# Patient Record
Sex: MALE | Race: WHITE | HISPANIC OR LATINO | Employment: FULL TIME | URBAN - METROPOLITAN AREA
[De-identification: names, ages, dates, MRNs, and addresses within clinical notes are randomized per-mention and may not be internally consistent; named-entity substitution may affect disease eponyms.]

---

## 2017-03-01 ENCOUNTER — OFFICE VISIT (OUTPATIENT)
Dept: URGENT CARE | Facility: URGENT CARE | Age: 42
End: 2017-03-01
Payer: COMMERCIAL

## 2017-03-01 VITALS
BODY MASS INDEX: 35.36 KG/M2 | SYSTOLIC BLOOD PRESSURE: 118 MMHG | HEART RATE: 101 BPM | HEIGHT: 66 IN | DIASTOLIC BLOOD PRESSURE: 60 MMHG | WEIGHT: 220 LBS | OXYGEN SATURATION: 97 % | TEMPERATURE: 98.8 F

## 2017-03-01 DIAGNOSIS — J20.9 ACUTE BRONCHITIS WITH SYMPTOMS > 10 DAYS: Primary | ICD-10-CM

## 2017-03-01 DIAGNOSIS — R05.9 COUGH: ICD-10-CM

## 2017-03-01 PROCEDURE — 87801 DETECT AGNT MULT DNA AMPLI: CPT | Performed by: FAMILY MEDICINE

## 2017-03-01 PROCEDURE — 99203 OFFICE O/P NEW LOW 30 MIN: CPT | Performed by: FAMILY MEDICINE

## 2017-03-01 RX ORDER — CODEINE PHOSPHATE AND GUAIFENESIN 10; 100 MG/5ML; MG/5ML
2 SOLUTION ORAL EVERY 4 HOURS PRN
Qty: 120 ML | Refills: 0 | Status: SHIPPED | OUTPATIENT
Start: 2017-03-01 | End: 2017-03-30

## 2017-03-01 RX ORDER — AZITHROMYCIN 250 MG/1
TABLET, FILM COATED ORAL
Qty: 6 TABLET | Refills: 0 | Status: SHIPPED | OUTPATIENT
Start: 2017-03-01 | End: 2017-03-30

## 2017-03-01 RX ORDER — BENZONATATE 200 MG/1
200 CAPSULE ORAL 3 TIMES DAILY PRN
Qty: 30 CAPSULE | Refills: 0 | Status: SHIPPED | OUTPATIENT
Start: 2017-03-01 | End: 2017-03-30

## 2017-03-01 NOTE — PROGRESS NOTES
"SUBJECTIVE:   Darrell Olivares is a 41 year old male presenting with a chief complaint of cough .  No history of asthma.  Onset of symptoms was 3 week(s) ago.  Course of illness is worsening.    Severity moderate  Current and Associated symptoms: cough   Treatment measures tried include Fluids, OTC meds - nyquil, severe cold OTC, ibuprofen and Rest.  Predisposing factors include None.    No past medical history on file.  Current Outpatient Prescriptions   Medication Sig Dispense Refill     OVER-THE-COUNTER Cough medicine       TESTOSTERONE IM        Social History   Substance Use Topics     Smoking status: Never Smoker     Smokeless tobacco: Not on file     Alcohol use Not on file       ROS:  CONSTITUTIONAL:NEGATIVE for fever, chills, change in weight and POSITIVE  for fatigue  INTEGUMENTARY/SKIN: NEGATIVE for worrisome rashes, moles or lesions  ENT/MOUTH: NEGATIVE for ear, mouth and throat problems and POSITIVE for nasal congestion and postnasal drainage  RESP:POSITIVE for cough-non productive  CV: NEGATIVE for chest pain, palpitations or peripheral edema  GI: NEGATIVE for nausea, abdominal pain, heartburn, or change in bowel habits  MUSCULOSKELETAL: NEGATIVE for significant arthralgias or myalgia    OBJECTIVE  :/60 (BP Location: Right arm, Patient Position: Chair, Cuff Size: Adult Regular)  Pulse 101  Temp 98.8  F (37.1  C) (Oral)  Ht 5' 6\" (1.676 m)  Wt 220 lb (99.8 kg)  SpO2 97%  BMI 35.51 kg/m2  GENERAL APPEARANCE: healthy, alert and no distress  EYES: EOMI,  PERRL, conjunctiva clear  HENT: ear canals and TM's normal.  Nose and mouth without ulcers, erythema or lesions  NECK: supple, nontender, no lymphadenopathy  RESP: lungs clear to auscultation - no rales, rhonchi or wheezes  CV: regular rates and rhythm, normal S1 S2, no murmur noted  NEURO: Normal strength and tone, sensory exam grossly normal,  normal speech and mentation  SKIN: no suspicious lesions or rashes  PSYCH: mentation appears normal and " affect normal/bright    ASSESSMENT/PLAN:  (J20.9) Acute bronchitis with symptoms > 10 days  (primary encounter diagnosis)  Plan: azithromycin (ZITHROMAX) 250 MG tablet            (R05) Cough  Comment: bronchitis  Plan: Bordetella per/paraper PCR, guaiFENesin-codeine        (ROBITUSSIN AC) 100-10 MG/5ML SOLN solution,         benzonatate (TESSALON) 200 MG capsule            Reassurance given, reviewed symptomatic treatment, plenty of fluids and rest.  RX Zpak given due to prolong duration of cough.  RX tessalon perles and tal AC given to help with cough.  Due to prolong cough, will screen for pertussis, discussed that if positive that Zpak will already cover for this infection but that cough will persist for several months.    Encourage to establish care with primary provider for routine care and follow up if no resolution of symptoms.    Dwain Comer MD

## 2017-03-01 NOTE — PATIENT INSTRUCTIONS
Take full course of antibiotic.  Okay to use tessalon perles to help with cough during the day and robitussin with codeine at bedtime.  Okay for tylenol and ibuprofen for discomfort.      Bronchitis, Antibiotic Treatment (Adult)    Bronchitis is an infection of the air passages (bronchial tubes) in your lungs. It often occurs when you have a cold. This illness is contagious during the first few days and is spread through the air by coughing and sneezing, or by direct contact (touching the sick person and then touching your own eyes, nose, or mouth).  Symptoms of bronchitis include cough with mucus (phlegm) and low-grade fever. Bronchitis usually lasts 7 to 14 days. Mild cases can be treated with simple home remedies. More severe infection is treated with an antibiotic.  Home care  Follow these guidelines when caring for yourself at home:    If your symptoms are severe, rest at home for the first 2 to 3 days. When you go back to your usual activities, don't let yourself get too tired.    Do not smoke. Also avoid being exposed to secondhand smoke.    You may use over-the-counter medicines to control fever or pain, unless another medicine was prescribed. (Note: If you have chronic liver or kidney disease or have ever had a stomach ulcer or gastrointestinal bleeding, talk with your healthcare provider before using these medicines. Also talk to your provider if you are taking medicine to prevent blood clots.) Aspirin should never be given to anyone younger than 18 years of age who is ill with a viral infection or fever. It may cause severe liver or brain damage.    Your appetite may be poor, so a light diet is fine. Avoid dehydration by drinking 6 to 8 glasses of fluids per day (such as water, soft drinks, sports drinks, juices, tea, or soup). Extra fluids will help loosen secretions in the nose and lungs.    Over-the-counter cough, cold, and sore-throat medicines will not shorten the length of the illness, but they may  "be helpful to reduce symptoms. (Note: Do not use decongestants if you have high blood pressure.)    Finish all antibiotic medicine. Do this even if you are feeling better after only a few days.  Follow-up care  Follow up with your healthcare provider, or as advised. If you had an X-ray or ECG (electrocardiogram), a specialist will review it. You will be notified of any new findings that may affect your care.  Note: If you are age 65 or older, or if you have a chronic lung disease or condition that affects your immune system, or you smoke, talk to your healthcare provider about having pneumococcal vaccinations and a yearly influenza vaccination (flu shot).  When to seek medical advice  Call your healthcare provider right away if any of these occur:    Fever of 100.4 F (38 C) or higher    Coughing up increased amounts of colored sputum    Weakness, drowsiness, headache, facial pain, ear pain, or a stiff neck   Call 911, or get immediate medical care  Contact emergency services right away if any of these occur.    Coughing up blood    Worsening weakness, drowsiness, headache, or stiff neck    Trouble breathing, wheezing, or pain with breathing    2022-7718 [employer]. [address] [place] [address]. All rights reserved. This information is not intended as a substitute for professional medical care. Always follow your healthcare professional's instructions.        Whooping Cough (Pertussis) (Adult)  Whooping cough (also called pertussis) is a bacterial infection in the respiratory tract. It can be a very serious infection in infants and older persons. In healthy older children and adults, it is generally mild.  Pertussis is highly contagious. The infection is spread through the air by coughing or sneezing. The illness starts like an ordinary cold with mild cough, congestion, and low fever. Symptoms then develop that may include:    Coughing spells that cause a \"whooping\" sound when breathing " in    Gagging or vomiting after coughing    Poor appetite    Feeling very tired    Thick mucus in the nose and throat  Antibiotics are used to treat this illness. Even with treatment, it may take up to 3 months for the cough to go away completely.  Vaccination prevents pertussis in children. The vaccine effect lessens after 5 to 10 years. So a booster vaccine is often needed. Teens and adults who were vaccinated as children and have not had a booster can be infected and may spread the infection to unvaccinated infants and children. Be sure to ask your healthcare provider whether anyone in your household needs a booster vaccination.  Home Care    Take all medicine as prescribed by the healthcare provider. Be sure to take antibiotics as directed until they are gone, even if you feel better. If you do not finish the antibiotics, the infection may come back and be harder to treat.    Rest and get plenty of sleep.    Stay home from work or school until you have completed at least 5 days of antibiotic treatment. If antibiotics are not used, stay home until 21 days after you first had symptoms of a cough. When resuming activity, go back to your normal routine gradually.    Avoid exposure to cigarette smoke.    Ask your healthcare provider before taking over-the-counter medicine for fever, pain, and coughing.    To avoid loss of fluids (dehydration), try drinking 6-8 glasses of fluids (water, juice, tea, soup) a day. Fluids will help loosen secretions in the nose and lungs.    Cover your mouth and nose when you sneeze or cough. Dispose of any tissues properly.    Wash your hands well with soap and water after you cough or sneeze, and frequently throughout the day.  Follow-up care  Follow up with your healthcare provider as advised.  When to seek medical advice  Call your healthcare provider right away for any of the following:    Trouble breathing or painful breathing    Cough with repeated gagging or vomiting    Coughing  up colored or bloody mucus    Severe headache or face, neck, or ear pain    Fever over 100.4 F (38.0 C) for more than 3 days    You don't start improving within 1 week    5279-7983 [employer]. [address] [place] [address], [place] [address]. All rights reserved. This information is not intended as a substitute for professional medical care. Always follow your healthcare professional's instructions.

## 2017-03-01 NOTE — MR AVS SNAPSHOT
After Visit Summary   3/1/2017    Darrell Olivares    MRN: [id number]           Patient Information     Date Of Birth          1975        Visit Information        Provider Department      3/1/2017 5:00 PM Dwain Comer MD [facility]        Today's Diagnoses     Acute bronchitis with symptoms > 10 days    -  1    Cough          Care Instructions    Take full course of antibiotic.  Okay to use tessalon perles to help with cough during the day and robitussin with codeine at bedtime.  Okay for tylenol and ibuprofen for discomfort.      Bronchitis, Antibiotic Treatment (Adult)    Bronchitis is an infection of the air passages (bronchial tubes) in your lungs. It often occurs when you have a cold. This illness is contagious during the first few days and is spread through the air by coughing and sneezing, or by direct contact (touching the sick person and then touching your own eyes, nose, or mouth).  Symptoms of bronchitis include cough with mucus (phlegm) and low-grade fever. Bronchitis usually lasts 7 to 14 days. Mild cases can be treated with simple home remedies. More severe infection is treated with an antibiotic.  Home care  Follow these guidelines when caring for yourself at home:    If your symptoms are severe, rest at home for the first 2 to 3 days. When you go back to your usual activities, don't let yourself get too tired.    Do not smoke. Also avoid being exposed to secondhand smoke.    You may use over-the-counter medicines to control fever or pain, unless another medicine was prescribed. (Note: If you have chronic liver or kidney disease or have ever had a stomach ulcer or gastrointestinal bleeding, talk with your healthcare provider before using these medicines. Also talk to your provider if you are taking medicine to prevent blood clots.) Aspirin should never be given to anyone younger than 18 years of age who is ill with a viral infection or fever. It may cause severe  liver or brain damage.    Your appetite may be poor, so a light diet is fine. Avoid dehydration by drinking 6 to 8 glasses of fluids per day (such as water, soft drinks, sports drinks, juices, tea, or soup). Extra fluids will help loosen secretions in the nose and lungs.    Over-the-counter cough, cold, and sore-throat medicines will not shorten the length of the illness, but they may be helpful to reduce symptoms. (Note: Do not use decongestants if you have high blood pressure.)    Finish all antibiotic medicine. Do this even if you are feeling better after only a few days.  Follow-up care  Follow up with your healthcare provider, or as advised. If you had an X-ray or ECG (electrocardiogram), a specialist will review it. You will be notified of any new findings that may affect your care.  Note: If you are age 65 or older, or if you have a chronic lung disease or condition that affects your immune system, or you smoke, talk to your healthcare provider about having pneumococcal vaccinations and a yearly influenza vaccination (flu shot).  When to seek medical advice  Call your healthcare provider right away if any of these occur:    Fever of 100.4 F (38 C) or higher    Coughing up increased amounts of colored sputum    Weakness, drowsiness, headache, facial pain, ear pain, or a stiff neck   Call 911, or get immediate medical care  Contact emergency services right away if any of these occur.    Coughing up blood    Worsening weakness, drowsiness, headache, or stiff neck    Trouble breathing, wheezing, or pain with breathing    [id number] [employer]. [address] [place] [address], [place] [address]. All rights reserved. This information is not intended as a substitute for professional medical care. Always follow your healthcare professional's instructions.        Whooping Cough (Pertussis) (Adult)  Whooping cough (also called pertussis) is a bacterial infection in the respiratory tract. It can be a very serious  "infection in infants and older persons. In healthy older children and adults, it is generally mild.  Pertussis is highly contagious. The infection is spread through the air by coughing or sneezing. The illness starts like an ordinary cold with mild cough, congestion, and low fever. Symptoms then develop that may include:    Coughing spells that cause a \"whooping\" sound when breathing in    Gagging or vomiting after coughing    Poor appetite    Feeling very tired    Thick mucus in the nose and throat  Antibiotics are used to treat this illness. Even with treatment, it may take up to 3 months for the cough to go away completely.  Vaccination prevents pertussis in children. The vaccine effect lessens after 5 to 10 years. So a booster vaccine is often needed. Teens and adults who were vaccinated as children and have not had a booster can be infected and may spread the infection to unvaccinated infants and children. Be sure to ask your healthcare provider whether anyone in your household needs a booster vaccination.  Home Care    Take all medicine as prescribed by the healthcare provider. Be sure to take antibiotics as directed until they are gone, even if you feel better. If you do not finish the antibiotics, the infection may come back and be harder to treat.    Rest and get plenty of sleep.    Stay home from work or school until you have completed at least 5 days of antibiotic treatment. If antibiotics are not used, stay home until 21 days after you first had symptoms of a cough. When resuming activity, go back to your normal routine gradually.    Avoid exposure to cigarette smoke.    Ask your healthcare provider before taking over-the-counter medicine for fever, pain, and coughing.    To avoid loss of fluids (dehydration), try drinking 6-8 glasses of fluids (water, juice, tea, soup) a day. Fluids will help loosen secretions in the nose and lungs.    Cover your mouth and nose when you sneeze or cough. Dispose of any " "tissues properly.    Wash your hands well with soap and water after you cough or sneeze, and frequently throughout the day.  Follow-up care  Follow up with your healthcare provider as advised.  When to seek medical advice  Call your healthcare provider right away for any of the following:    Trouble breathing or painful breathing    Cough with repeated gagging or vomiting    Coughing up colored or bloody mucus    Severe headache or face, neck, or ear pain    Fever over 100.4 F (38.0 C) for more than 3 days    You don't start improving within 1 week    5283-1485 [employer]. [address] [place] [address]. All rights reserved. This information is not intended as a substitute for professional medical care. Always follow your healthcare professional's instructions.              Follow-ups after your visit        Who to contact     If you have questions or need follow up information about today's clinic visit or your schedule please contact [facility] directly at [phone].  Normal or non-critical lab and imaging results will be communicated to you by [employer], letter or phone within 4 business days after the clinic has received the results. If you do not hear from us within 7 days, please contact the clinic through [employer] or phone. If you have a critical or abnormal lab result, we will notify you by phone as soon as possible.  Submit refill requests through [facility] ([facility]) or call your pharmacy and they will forward the refill request to us. Please allow 3 business days for your refill to be completed.          Additional Information About Your Visit        [facility] (NI[facility]) Information     [facility] ([facility]) lets you send messages to your doctor, view your test results, renew your prescriptions, schedule appointments and more. To sign up, go to [email or web address] . Click on \"Log in\" on the left side of the screen, which will take you to the Welcome page. Then click on \"Sign up Now\" on the right " "side of the page.     You will be asked to enter the access code listed below, as well as some personal information. Please follow the directions to create your username and password.     Your access code is: [email or web address]  Expires: 2017  5:59 PM     Your access code will  in 90 days. If you need help or a new code, please call your [facility] clinic or [phone].        Care EveryWhere ID     This is your Care EveryWhere ID. This could be used by other organizations to access your [facility] medical records  [id number]        Your Vitals Were     Pulse Temperature Height Pulse Oximetry BMI (Body Mass Index)       101 98.8  F (37.1  C) (Oral) 5' 6\" (1.676 m) 97% 35.51 kg/m2        Blood Pressure from Last 3 Encounters:   17 118/60    Weight from Last 3 Encounters:   17 220 lb (99.8 kg)              We Performed the Following     Bordetella per/paraper PCR          Today's Medication Changes          These changes are accurate as of: 3/1/17  5:59 PM.  If you have any questions, ask your nurse or doctor.               Start taking these medicines.        Dose/Directions    azithromycin 250 MG tablet   Commonly known as:  ZITHROMAX   Used for:  Acute bronchitis with symptoms > 10 days   Started by:  Dwain Comer MD        Two tablets first day, then one tablet daily for four days.   Quantity:  6 tablet   Refills:  0       benzonatate 200 MG capsule   Commonly known as:  TESSALON   Used for:  Cough   Started by:  Dwain Comer MD        Dose:  200 mg   Take 1 capsule (200 mg) by mouth 3 times daily as needed for cough   Quantity:  30 capsule   Refills:  0       guaiFENesin-codeine 100-10 MG/5ML Soln solution   Commonly known as:  ROBITUSSIN AC   Used for:  Cough   Started by:  Dwain Comer MD        Dose:  2 tsp.   Take 10 mLs by mouth every 4 hours as needed for cough   Quantity:  120 mL   Refills:  0            Where to get your medicines      Some of these will need a paper prescription and " others can be bought over the counter.  Ask your nurse if you have questions.     Bring a paper prescription for each of these medications     azithromycin 250 MG tablet    benzonatate 200 MG capsule    guaiFENesin-codeine 100-10 MG/5ML Soln solution                Primary Care Provider    None Specified       No primary provider on file.        Thank you!     Thank you for choosing [facility]  for your care. Our goal is always to provide you with excellent care. Hearing back from our patients is one way we can continue to improve our services. Please take a few minutes to complete the written survey that you may receive in the mail after your visit with us. Thank you!             Your Updated Medication List - Protect others around you: Learn how to safely use, store and throw away your medicines at www.disposemymeds.org.          This list is accurate as of: 3/1/17  5:59 PM.  Always use your most recent med list.                   Brand Name Dispense Instructions for use    azithromycin 250 MG tablet    ZITHROMAX    6 tablet    Two tablets first day, then one tablet daily for four days.       benzonatate 200 MG capsule    TESSALON    30 capsule    Take 1 capsule (200 mg) by mouth 3 times daily as needed for cough       guaiFENesin-codeine 100-10 MG/5ML Soln solution    ROBITUSSIN AC    120 mL    Take 10 mLs by mouth every 4 hours as needed for cough       OVER-THE-COUNTER      Cough medicine       TESTOSTERONE IM

## 2017-03-01 NOTE — NURSING NOTE
"Darrell Olivares;   Chief Complaint   Patient presents with     Cough     onset 2-3 weeks ago      Urgent Care     Initial /60 (BP Location: Right arm, Patient Position: Chair, Cuff Size: Adult Regular)  Pulse 101  Temp 98.8  F (37.1  C) (Oral)  Ht 5' 6\" (1.676 m)  Wt 220 lb (99.8 kg)  SpO2 97%  BMI 35.51 kg/m2 Estimated body mass index is 35.51 kg/(m^2) as calculated from the following:    Height as of this encounter: 5' 6\" (1.676 m).    Weight as of this encounter: 220 lb (99.8 kg)..  BP completed using cuff size regular.  Denia Rasmussen R.N.  "

## 2017-03-03 LAB
B PERT+PARAPERT DNA PNL SPEC NAA+PROBE: NORMAL
SPECIMEN SOURCE: NORMAL

## 2017-03-04 PROBLEM — R79.89 LOW TESTOSTERONE: Status: ACTIVE | Noted: 2017-03-04

## 2017-03-30 ENCOUNTER — OFFICE VISIT (OUTPATIENT)
Dept: PEDIATRICS | Facility: CLINIC | Age: 42
End: 2017-03-30
Payer: COMMERCIAL

## 2017-03-30 VITALS
BODY MASS INDEX: 33.75 KG/M2 | WEIGHT: 210 LBS | HEART RATE: 104 BPM | OXYGEN SATURATION: 96 % | SYSTOLIC BLOOD PRESSURE: 104 MMHG | DIASTOLIC BLOOD PRESSURE: 68 MMHG | HEIGHT: 66 IN | TEMPERATURE: 98.3 F

## 2017-03-30 DIAGNOSIS — R79.89 LOW TESTOSTERONE: ICD-10-CM

## 2017-03-30 DIAGNOSIS — E29.1 MALE HYPOGONADISM: Primary | ICD-10-CM

## 2017-03-30 LAB
ERYTHROCYTE [DISTWIDTH] IN BLOOD BY AUTOMATED COUNT: 11.9 % (ref 10–15)
HBA1C MFR BLD: 6.8 % (ref 4.3–6)
HCT VFR BLD AUTO: 47.2 % (ref 40–53)
HGB BLD-MCNC: 16.2 G/DL (ref 13.3–17.7)
MCH RBC QN AUTO: 30.2 PG (ref 26.5–33)
MCHC RBC AUTO-ENTMCNC: 34.3 G/DL (ref 31.5–36.5)
MCV RBC AUTO: 88 FL (ref 78–100)
PLATELET # BLD AUTO: 228 10E9/L (ref 150–450)
RBC # BLD AUTO: 5.36 10E12/L (ref 4.4–5.9)
WBC # BLD AUTO: 10.4 10E9/L (ref 4–11)

## 2017-03-30 PROCEDURE — 84403 ASSAY OF TOTAL TESTOSTERONE: CPT | Performed by: INTERNAL MEDICINE

## 2017-03-30 PROCEDURE — 84270 ASSAY OF SEX HORMONE GLOBUL: CPT | Performed by: INTERNAL MEDICINE

## 2017-03-30 PROCEDURE — 36415 COLL VENOUS BLD VENIPUNCTURE: CPT | Performed by: INTERNAL MEDICINE

## 2017-03-30 PROCEDURE — G0103 PSA SCREENING: HCPCS | Performed by: INTERNAL MEDICINE

## 2017-03-30 PROCEDURE — 80053 COMPREHEN METABOLIC PANEL: CPT | Performed by: INTERNAL MEDICINE

## 2017-03-30 PROCEDURE — 83036 HEMOGLOBIN GLYCOSYLATED A1C: CPT | Performed by: INTERNAL MEDICINE

## 2017-03-30 PROCEDURE — 99214 OFFICE O/P EST MOD 30 MIN: CPT | Performed by: INTERNAL MEDICINE

## 2017-03-30 PROCEDURE — 85027 COMPLETE CBC AUTOMATED: CPT | Performed by: INTERNAL MEDICINE

## 2017-03-30 PROCEDURE — 83721 ASSAY OF BLOOD LIPOPROTEIN: CPT | Mod: 59 | Performed by: INTERNAL MEDICINE

## 2017-03-30 PROCEDURE — 82465 ASSAY BLD/SERUM CHOLESTEROL: CPT | Performed by: INTERNAL MEDICINE

## 2017-03-30 RX ORDER — TESTOSTERONE CYPIONATE 200 MG/ML
200 INJECTION, SOLUTION INTRAMUSCULAR
Qty: 2 ML | Refills: 3 | Status: SHIPPED | OUTPATIENT
Start: 2017-03-30 | End: 2017-04-25

## 2017-03-30 NOTE — MR AVS SNAPSHOT
After Visit Summary   3/30/2017    Darrell Olivares    MRN: [id number]           Patient Information     Date Of Birth          1975        Visit Information        Provider Department      3/30/2017 3:10 PM Andre Fischer MD [facility]        Today's Diagnoses     Male hypogonadism    -  1    Low testosterone          Care Instructions    1) Restart previous therapy at 200 IM every 2 weeks    2) Labs today    Let me know if having issues with this.    Andre Fischer MD      Risks of Masculinizing Hormone Therapy (FtM)    Increased Risk  -Polycythemia: Increased amount of circulating red cells in the blood, which can cause heart failure or pre-dispose to blood clots or strokes.   -Weight gain: modest increased weight gain is often seen  -Lipids: often can decrease HDL (good cholesterol)  -Liver: can cause elevation in liver enzymes and rarely liver toxicity  -Psychiatric: may worsen underlying mental illness    Unclear risks  -Osteoporosis: decreased bone density can be seen in association with testosterone therapy  -Hypertension: elevated blood pressure can be seen with use of testosterone  -Fertility: may decrease ability to become pregnant. Fertility options should be considered before starting testosterone therapy.             Follow-ups after your visit        Who to contact     If you have questions or need follow up information about today's clinic visit or your schedule please contact [facility] directly at [phone].  Normal or non-critical lab and imaging results will be communicated to you by MyChart, letter or phone within 4 business days after the clinic has received the results. If you do not hear from us within 7 days, please contact the clinic through Fixstarshart or phone. If you have a critical or abnormal lab result, we will notify you by phone as soon as possible.  Submit refill requests through [employer] or call your pharmacy and they will forward the  "refill request to us. Please allow 3 business days for your refill to be completed.          Additional Information About Your Visit        [employer]atell[employer] Information     Tedcas lets you send messages to your doctor, view your test results, renew your prescriptions, schedule appointments and more. To sign up, go to [email or web address] . Click on \"Log in\" on the left side of the screen, which will take you to the Welcome page. Then click on \"Sign up Now\" on the right side of the page.     You will be asked to enter the access code listed below, as well as some personal information. Please follow the directions to create your username and password.     Your access code is: [email or web address]  Expires: 2017  6:59 PM     Your access code will  in 90 days. If you need help or a new code, please call your [facility] clinic or [phone].        Care EveryWhere ID     This is your Care EveryWhere ID. This could be used by other organizations to access your [facility] medical records  [id number]        Your Vitals Were     Pulse Temperature Height Pulse Oximetry BMI (Body Mass Index)       104 98.3  F (36.8  C) (Oral) 5' 6\" (1.676 m) 96% 33.89 kg/m2        Blood Pressure from Last 3 Encounters:   17 104/68   17 118/60    Weight from Last 3 Encounters:   17 210 lb (95.3 kg)   17 220 lb (99.8 kg)              We Performed the Following     **Testosterone Free and Total FUTURE anytime     CBC with platelets     Cholesterol     Comprehensive metabolic panel     Hemoglobin A1c     LDL cholesterol direct     PSA, screen          Today's Medication Changes          These changes are accurate as of: 3/30/17  3:34 PM.  If you have any questions, ask your nurse or doctor.               Start taking these medicines.        Dose/Directions    testosterone cypionate 200 MG/ML injection   Commonly known as:  DEPOTESTOTERONE CYPIONATE   Used for:  Male hypogonadism, Low testosterone   Started by:  Andre Fischer " MD [facility]        Dose:  200 mg   Inject 1 mL (200 mg) into the muscle every 14 days   Quantity:  2 mL   Refills:  3            Where to get your medicines      Some of these will need a paper prescription and others can be bought over the counter.  Ask your nurse if you have questions.     Bring a paper prescription for each of these medications     testosterone cypionate 200 MG/ML injection                Primary Care Provider    None Specified       No primary provider on file.        Thank you!     Thank you for choosing [facility]  for your care. Our goal is always to provide you with excellent care. Hearing back from our patients is one way we can continue to improve our services. Please take a few minutes to complete the written survey that you may receive in the mail after your visit with us. Thank you!             Your Updated Medication List - Protect others around you: Learn how to safely use, store and throw away your medicines at www.disposemymeds.org.          This list is accurate as of: 3/30/17  3:34 PM.  Always use your most recent med list.                   Brand Name Dispense Instructions for use    testosterone cypionate 200 MG/ML injection    DEPOTESTOTERONE CYPIONATE    2 mL    Inject 1 mL (200 mg) into the muscle every 14 days

## 2017-03-30 NOTE — LETTER
[facility]  [address]  [place] [address]                  [phone]   April 4, 2017    Darrell GUEVARA Olivares  [address] [place] [address]      Dear Darrell,    Here are the results from the recent Labs that we did.     Your testosterone was in a low level. This is generally low enough for insurance to cover. We can discuss the diabetes in a follow-up in the next couple of weeks.    Let me know if you have questions or concerns!    Sincerely,      Andre Fischer MD  Internal Medicine and Pediatrics      Results for orders placed or performed in visit on 03/30/17   **Testosterone Free and Total FUTURE anytime   Result Value Ref Range    Testosterone Total 227 (L) 240 - 950 ng/dL    Sex Hormone Binding Globulin 17 11 - 80 nmol/L    Free Testosterone Calculated 6.05 4.7 - 24.4 ng/dL   PSA, screen   Result Value Ref Range    PSA 0.59 0 - 4 ug/L   CBC with platelets   Result Value Ref Range    WBC 10.4 4.0 - 11.0 10e9/L    RBC Count 5.36 4.4 - 5.9 10e12/L    Hemoglobin 16.2 13.3 - 17.7 g/dL    Hematocrit 47.2 40.0 - 53.0 %    MCV 88 78 - 100 fl    MCH 30.2 26.5 - 33.0 pg    MCHC 34.3 31.5 - 36.5 g/dL    RDW 11.9 10.0 - 15.0 %    Platelet Count 228 150 - 450 10e9/L   Comprehensive metabolic panel   Result Value Ref Range    Sodium 139 133 - 144 mmol/L    Potassium 4.3 3.4 - 5.3 mmol/L    Chloride 104 94 - 109 mmol/L    Carbon Dioxide 25 20 - 32 mmol/L    Anion Gap 10 3 - 14 mmol/L    Glucose 165 (H) 70 - 99 mg/dL    Urea Nitrogen 14 7 - 30 mg/dL    Creatinine 1.00 0.66 - 1.25 mg/dL    GFR Estimate 82 >60 mL/min/1.7m2    GFR Estimate If Black >90   GFR Calc   >60 mL/min/1.7m2    Calcium 8.7 8.5 - 10.1 mg/dL    Bilirubin Total 0.4 0.2 - 1.3 mg/dL    Albumin 4.2 3.4 - 5.0 g/dL    Protein Total 7.4 6.8 - 8.8 g/dL    Alkaline Phosphatase 90 40 - 150 U/L    ALT 83 (H) 0 - 70 U/L    AST 34 0 - 45 U/L   Cholesterol   Result Value Ref Range    Cholesterol 217 (H)  <200 mg/dL   LDL cholesterol direct   Result Value Ref Range    LDL Cholesterol Direct 143 (H) <100 mg/dL   Hemoglobin A1c   Result Value Ref Range    Hemoglobin A1C 6.8 (H) 4.3 - 6.0 %

## 2017-03-30 NOTE — PROGRESS NOTES
"  SUBJECTIVE:                                                    Darrell Olivares is a 41 year old male who presents to clinic today for the following health issues:      Discuss Testosterone     Patient presents for transfer of care and also to discuss starting testosterone therapy. Patient was evaluated by PCP in [place] (notes in chart) and was noted to have a very low testosterone level of 7. He was started on 200 mg of IM testosterone q2 weeks and notes that this helped greatly with energy, mood, and labia. He did not want to use creams or patches due to younger children at home.     He has otherwise been healthy. He has been off testosterone for the last several months. No chest pain, no shortness of breath. No palpitations.    FMH: no prostate cancer, no colon cancer. Father with DM2    Problem list and histories reviewed & adjusted, as indicated.  Additional history: as documented    Patient Active Problem List   Diagnosis     Low testosterone     History reviewed. No pertinent surgical history.    Social History   Substance Use Topics     Smoking status: Never Smoker     Smokeless tobacco: Not on file     Alcohol use Not on file     History reviewed. No pertinent family history.        Reviewed and updated as needed this visit by clinical staff       Reviewed and updated as needed this visit by Provider         ROS:  Constitutional, HEENT, cardiovascular, pulmonary, GI, , musculoskeletal, neuro, skin, endocrine and psych systems are negative, except as otherwise noted.    OBJECTIVE:                                                    /68 (BP Location: Right arm, Cuff Size: Adult Large)  Pulse 104  Temp 98.3  F (36.8  C) (Oral)  Ht 5' 6\" (1.676 m)  Wt 210 lb (95.3 kg)  SpO2 96%  BMI 33.89 kg/m2  Body mass index is 33.89 kg/(m^2).  GENERAL: healthy, alert and no distress  EYES: Eyes grossly normal to inspection, PERRL and conjunctivae and sclerae normal  HENT: ear canals and TM's normal, nose " and mouth without ulcers or lesions  NECK: no adenopathy, no asymmetry, masses, or scars and thyroid normal to palpation  RESP: lungs clear to auscultation - no rales, rhonchi or wheezes  CV: regular rate and rhythm, normal S1 S2, no S3 or S4, no murmur, click or rub, no peripheral edema and peripheral pulses strong  ABDOMEN: soft, nontender, no hepatosplenomegaly, no masses and bowel sounds normal  MS: no gross musculoskeletal defects noted, no edema  SKIN: no suspicious lesions or rashes  NEURO: Normal strength and tone, mentation intact and speech normal  PSYCH: mentation appears normal, affect normal/bright    Diagnostic Test Results:  Results for orders placed or performed in visit on 03/30/17   CBC with platelets   Result Value Ref Range    WBC 10.4 4.0 - 11.0 10e9/L    RBC Count 5.36 4.4 - 5.9 10e12/L    Hemoglobin 16.2 13.3 - 17.7 g/dL    Hematocrit 47.2 40.0 - 53.0 %    MCV 88 78 - 100 fl    MCH 30.2 26.5 - 33.0 pg    MCHC 34.3 31.5 - 36.5 g/dL    RDW 11.9 10.0 - 15.0 %    Platelet Count 228 150 - 450 10e9/L   Hemoglobin A1c   Result Value Ref Range    Hemoglobin A1C 6.8 (H) 4.3 - 6.0 %        ASSESSMENT/PLAN:                                                    1. Male hypogonadism  Will restart therapy and get baseline labs today. Does have diabetes based on labs today, will have come back for further discussion and evaluation  - **Testosterone Free and Total FUTURE anytime  - testosterone cypionate (DEPOTESTOTERONE CYPIONATE) 200 MG/ML injection; Inject 1 mL (200 mg) into the muscle every 14 days  Dispense: 2 mL; Refill: 3  - PSA, screen  - CBC with platelets  - Comprehensive metabolic panel  - Cholesterol  - LDL cholesterol direct  - Hemoglobin A1c    2. Low testosterone  As noted above      Patient Instructions   1) Restart previous therapy at 200 IM every 2 weeks    2) Labs today    Let me know if having issues with this.    Andre Fischer MD      Risks of Masculinizing Hormone Therapy (FtM)    Increased  Risk  -Polycythemia: Increased amount of circulating red cells in the blood, which can cause heart failure or pre-dispose to blood clots or strokes.   -Weight gain: modest increased weight gain is often seen  -Lipids: often can decrease HDL (good cholesterol)  -Liver: can cause elevation in liver enzymes and rarely liver toxicity  -Psychiatric: may worsen underlying mental illness    Unclear risks  -Osteoporosis: decreased bone density can be seen in association with testosterone therapy  -Hypertension: elevated blood pressure can be seen with use of testosterone  -Fertility: may decrease ability to become pregnant. Fertility options should be considered before starting testosterone therapy.             Andre Fischer MD, MD  [facility]

## 2017-03-30 NOTE — NURSING NOTE
"Chief Complaint   Patient presents with     Recheck Medication       Initial /68 (BP Location: Right arm, Cuff Size: Adult Large)  Pulse 104  Temp 98.3  F (36.8  C) (Oral)  Ht 5' 6\" (1.676 m)  Wt 210 lb (95.3 kg)  SpO2 96%  BMI 33.89 kg/m2 Estimated body mass index is 33.89 kg/(m^2) as calculated from the following:    Height as of this encounter: 5' 6\" (1.676 m).    Weight as of this encounter: 210 lb (95.3 kg).  Medication Reconciliation: complete   Mirna Nogueira MA    "

## 2017-03-31 LAB
ALBUMIN SERPL-MCNC: 4.2 G/DL (ref 3.4–5)
ALP SERPL-CCNC: 90 U/L (ref 40–150)
ALT SERPL W P-5'-P-CCNC: 83 U/L (ref 0–70)
ANION GAP SERPL CALCULATED.3IONS-SCNC: 10 MMOL/L (ref 3–14)
AST SERPL W P-5'-P-CCNC: 34 U/L (ref 0–45)
BILIRUB SERPL-MCNC: 0.4 MG/DL (ref 0.2–1.3)
BUN SERPL-MCNC: 14 MG/DL (ref 7–30)
CALCIUM SERPL-MCNC: 8.7 MG/DL (ref 8.5–10.1)
CHLORIDE SERPL-SCNC: 104 MMOL/L (ref 94–109)
CHOLEST SERPL-MCNC: 217 MG/DL
CO2 SERPL-SCNC: 25 MMOL/L (ref 20–32)
CREAT SERPL-MCNC: 1 MG/DL (ref 0.66–1.25)
GFR SERPL CREATININE-BSD FRML MDRD: 82 ML/MIN/1.7M2
GLUCOSE SERPL-MCNC: 165 MG/DL (ref 70–99)
LDLC SERPL DIRECT ASSAY-MCNC: 143 MG/DL
POTASSIUM SERPL-SCNC: 4.3 MMOL/L (ref 3.4–5.3)
PROT SERPL-MCNC: 7.4 G/DL (ref 6.8–8.8)
PSA SERPL-ACNC: 0.59 UG/L (ref 0–4)
SODIUM SERPL-SCNC: 139 MMOL/L (ref 133–144)

## 2017-04-03 LAB
SHBG SERPL-SCNC: 17 NMOL/L (ref 11–80)
TESTOST FREE SERPL-MCNC: 6.05 NG/DL (ref 4.7–24.4)
TESTOST SERPL-MCNC: 227 NG/DL (ref 240–950)

## 2017-04-04 ENCOUNTER — TELEPHONE (OUTPATIENT)
Dept: PEDIATRICS | Facility: CLINIC | Age: 42
End: 2017-04-04

## 2017-04-04 NOTE — TELEPHONE ENCOUNTER
[facility] PA department calling for pt. Previous Testosterone level in order to submit PA request.     Mendy Obrien RN, BSN, PHN

## 2017-04-04 NOTE — TELEPHONE ENCOUNTER
PA requested for testosterone, submitted via covermymeds. Await decision.    Darrell Carpentert (Key: [id number])  Testosterone Cypionate 200MG/ML solution  Status: PA Request  Created: March 30th, 2017 [phone]  Sent: April 4th, 2017      Mirna Nogueira MA

## 2017-04-25 ENCOUNTER — OFFICE VISIT (OUTPATIENT)
Dept: PEDIATRICS | Facility: CLINIC | Age: 42
End: 2017-04-25
Payer: COMMERCIAL

## 2017-04-25 VITALS
DIASTOLIC BLOOD PRESSURE: 68 MMHG | OXYGEN SATURATION: 97 % | WEIGHT: 209 LBS | HEIGHT: 66 IN | BODY MASS INDEX: 33.59 KG/M2 | SYSTOLIC BLOOD PRESSURE: 122 MMHG | TEMPERATURE: 98.1 F | HEART RATE: 98 BPM

## 2017-04-25 DIAGNOSIS — R79.89 LOW TESTOSTERONE: ICD-10-CM

## 2017-04-25 DIAGNOSIS — E29.1 MALE HYPOGONADISM: ICD-10-CM

## 2017-04-25 DIAGNOSIS — E11.9 TYPE 2 DIABETES MELLITUS WITHOUT COMPLICATION, WITHOUT LONG-TERM CURRENT USE OF INSULIN (H): Primary | ICD-10-CM

## 2017-04-25 DIAGNOSIS — R35.0 URINARY FREQUENCY: ICD-10-CM

## 2017-04-25 LAB
ALBUMIN UR-MCNC: NEGATIVE MG/DL
APPEARANCE UR: CLEAR
BILIRUB UR QL STRIP: NEGATIVE
COLOR UR AUTO: YELLOW
GLUCOSE UR STRIP-MCNC: NEGATIVE MG/DL
HGB UR QL STRIP: NEGATIVE
KETONES UR STRIP-MCNC: NEGATIVE MG/DL
LEUKOCYTE ESTERASE UR QL STRIP: NEGATIVE
NITRATE UR QL: NEGATIVE
PH UR STRIP: 5.5 PH (ref 5–7)
SP GR UR STRIP: 1.02 (ref 1–1.03)
URN SPEC COLLECT METH UR: NORMAL
UROBILINOGEN UR STRIP-ACNC: 0.2 EU/DL (ref 0.2–1)

## 2017-04-25 PROCEDURE — 99214 OFFICE O/P EST MOD 30 MIN: CPT | Performed by: INTERNAL MEDICINE

## 2017-04-25 PROCEDURE — 99207 C FOOT EXAM  NO CHARGE: CPT | Performed by: INTERNAL MEDICINE

## 2017-04-25 PROCEDURE — 81003 URINALYSIS AUTO W/O SCOPE: CPT | Performed by: INTERNAL MEDICINE

## 2017-04-25 PROCEDURE — 82043 UR ALBUMIN QUANTITATIVE: CPT | Performed by: INTERNAL MEDICINE

## 2017-04-25 PROCEDURE — 87591 N.GONORRHOEAE DNA AMP PROB: CPT | Performed by: INTERNAL MEDICINE

## 2017-04-25 PROCEDURE — 87491 CHLMYD TRACH DNA AMP PROBE: CPT | Performed by: INTERNAL MEDICINE

## 2017-04-25 RX ORDER — LISINOPRIL 2.5 MG/1
2.5 TABLET ORAL DAILY
Qty: 90 TABLET | Refills: 1 | Status: SHIPPED | OUTPATIENT
Start: 2017-04-25 | End: 2017-11-01

## 2017-04-25 RX ORDER — SIMVASTATIN 20 MG
20 TABLET ORAL AT BEDTIME
Qty: 90 TABLET | Refills: 1 | Status: SHIPPED | OUTPATIENT
Start: 2017-04-25 | End: 2017-11-01

## 2017-04-25 RX ORDER — TESTOSTERONE CYPIONATE 200 MG/ML
200 INJECTION, SOLUTION INTRAMUSCULAR
Qty: 2 ML | Refills: 3 | Status: SHIPPED | OUTPATIENT
Start: 2017-04-25 | End: 2017-07-06

## 2017-04-25 RX ORDER — DOXYCYCLINE 100 MG/1
100 TABLET ORAL 2 TIMES DAILY
Qty: 14 TABLET | Refills: 0 | Status: SHIPPED | OUTPATIENT
Start: 2017-04-25 | End: 2017-05-02

## 2017-04-25 NOTE — MR AVS SNAPSHOT
After Visit Summary   4/25/2017    Darrell Olivares    MRN: [id number]           Patient Information     Date Of Birth          1975        Visit Information        Provider Department      4/25/2017 4:40 PM Andre Fischer MD [facility]        Today's Diagnoses     Type 2 diabetes mellitus without complication, without long-term current use of insulin (H)    -  1    Urinary frequency          Care Instructions    1) We will do urine tests looking for infection today but will start doxycycline twice per day for 7 days    2) Make a lab only appointment in 1-2 weeks to recheck kidney function and to check your thyroid function. This can be a lab only exam for this.    Recheck in 3 months with recheck of your testosterone, we should check your levels right between doses    Andre Fischer MD        Follow-ups after your visit        Future tests that were ordered for you today     Open Future Orders        Priority Expected Expires Ordered    TSH with free T4 reflex Routine  4/25/2018 4/25/2017    Basic metabolic panel Routine  4/25/2018 4/25/2017            Who to contact     If you have questions or need follow up information about today's clinic visit or your schedule please contact [facility] directly at [phone].  Normal or non-critical lab and imaging results will be communicated to you by MyChart, letter or phone within 4 business days after the clinic has received the results. If you do not hear from us within 7 days, please contact the clinic through Quikey[employer] or phone. If you have a critical or abnormal lab result, we will notify you by phone as soon as possible.  Submit refill requests through [employer] or call your pharmacy and they will forward the refill request to us. Please allow 3 business days for your refill to be completed.          Additional Information About Your Visit        Quikeyhart Information     UpDown lets you send messages to your doctor, view  "your test results, renew your prescriptions, schedule appointments and more. To sign up, go to [email or web address] . Click on \"Log in\" on the left side of the screen, which will take you to the Welcome page. Then click on \"Sign up Now\" on the right side of the page.     You will be asked to enter the access code listed below, as well as some personal information. Please follow the directions to create your username and password.     Your access code is: [email or web address]  Expires: 2017  6:59 PM     Your access code will  in 90 days. If you need help or a new code, please call your [facility] clinic or [phone].        Care EveryWhere ID     This is your Care EveryWhere ID. This could be used by other organizations to access your [facility] medical records  [id number]        Your Vitals Were     Pulse Temperature Height Pulse Oximetry BMI (Body Mass Index)       98 98.1  F (36.7  C) (Oral) 5' 6\" (1.676 m) 97% 33.73 kg/m2        Blood Pressure from Last 3 Encounters:   17 122/68   17 104/68   17 118/60    Weight from Last 3 Encounters:   17 209 lb (94.8 kg)   17 210 lb (95.3 kg)   17 220 lb (99.8 kg)              We Performed the Following     *UA reflex to Microscopic and Culture ([place] and [facility] (except [place] and [place])     Albumin Random Urine Quantitative     CHLAMYDIA TRACHOMATIS PCR     FOOT EXAM     NEISSERIA GONORRHOEA PCR          Today's Medication Changes          These changes are accurate as of: 17  5:09 PM.  If you have any questions, ask your nurse or doctor.               Start taking these medicines.        Dose/Directions    ASPIRIN NOT PRESCRIBED   Commonly known as:  INTENTIONAL   Used for:  Type 2 diabetes mellitus without complication, without long-term current use of insulin (H)   Started by:  Andre Fischer MD        Please choose reason not prescribed, below   Quantity:  1 each   Refills:  0       doxycycline " Monohydrate 100 MG Tabs   Used for:  Urinary frequency   Started by:  Andre Fischer MD        Dose:  100 mg   Take 100 mg by mouth 2 times daily for 7 days   Quantity:  14 tablet   Refills:  0       lisinopril 2.5 MG tablet   Commonly known as:  PRINIVIL/Zestril   Used for:  Type 2 diabetes mellitus without complication, without long-term current use of insulin (H)   Started by:  Andre Fischer MD        Dose:  2.5 mg   Take 1 tablet (2.5 mg) by mouth daily   Quantity:  90 tablet   Refills:  1       simvastatin 20 MG tablet   Commonly known as:  ZOCOR   Used for:  Type 2 diabetes mellitus without complication, without long-term current use of insulin (H)   Started by:  Andre Fischer MD        Dose:  20 mg   Take 1 tablet (20 mg) by mouth At Bedtime   Quantity:  90 tablet   Refills:  1            Where to get your medicines      These medications were sent to [id number] - [place] - [address]  [address] [address]     Phone:  [phone]     doxycycline Monohydrate 100 MG Tabs    lisinopril 2.5 MG tablet    simvastatin 20 MG tablet         Some of these will need a paper prescription and others can be bought over the counter.  Ask your nurse if you have questions.     You don't need a prescription for these medications     ASPIRIN NOT PRESCRIBED                Primary Care Provider Office Phone # Fax #    Andre Fischer [phone]       [facility] [address] [place] [address] [address] [place] [address]        Thank you!     Thank you for choosing [facility]  for your care. Our goal is always to provide you with excellent care. Hearing back from our patients is one way we can continue to improve our services. Please take a few minutes to complete the written survey that you may receive in the mail after your visit with us. Thank you!             Your Updated Medication List - Protect others around you:  Learn how to safely use, store and throw away your medicines at www.disposemymeds.org.          This list is accurate as of: 4/25/17  5:09 PM.  Always use your most recent med list.                   Brand Name Dispense Instructions for use    ASPIRIN NOT PRESCRIBED    INTENTIONAL    1 each    Please choose reason not prescribed, below       doxycycline Monohydrate 100 MG Tabs     14 tablet    Take 100 mg by mouth 2 times daily for 7 days       lisinopril 2.5 MG tablet    PRINIVIL/Zestril    90 tablet    Take 1 tablet (2.5 mg) by mouth daily       simvastatin 20 MG tablet    ZOCOR    90 tablet    Take 1 tablet (20 mg) by mouth At Bedtime       testosterone cypionate 200 MG/ML injection    DEPOTESTOTERONE    2 mL    Inject 1 mL (200 mg) into the muscle every 14 days

## 2017-04-25 NOTE — PATIENT INSTRUCTIONS
1) We will do urine tests looking for infection today but will start doxycycline twice per day for 7 days    2) Make a lab only appointment in 1-2 weeks to recheck kidney function and to check your thyroid function. This can be a lab only exam for this.    Recheck in 3 months with recheck of your testosterone, we should check your levels right between doses    Andre Fischer MD

## 2017-04-25 NOTE — PROGRESS NOTES
"  SUBJECTIVE:                                                    Darrell Olivares is a 41 year old male who presents to clinic today for the following health issues:      Follow up     DM2: noted on last testing. Father with DM2. No polyuria or polyphagia. Has been trying to exercise more recently. Trying to loose weight. Testosterone helping with energy. Has not been on statin or ACE therapy. Just had eye exam that was ok without changes.     Dysuria: has noted pain with urination. No penile discharge, sexually active with one female partner, required treatment with antibiotics in the past.     Problem list and histories reviewed & adjusted, as indicated.  Additional history: as documented    Patient Active Problem List   Diagnosis     Low testosterone     Male hypogonadism     History reviewed. No pertinent surgical history.    Social History   Substance Use Topics     Smoking status: Never Smoker     Smokeless tobacco: Not on file     Alcohol use Not on file     History reviewed. No pertinent family history.        Reviewed and updated as needed this visit by clinical staff  Tobacco  Allergies  Meds  Med Hx  Surg Hx  Fam Hx  Soc Hx      Reviewed and updated as needed this visit by Provider         ROS:  Constitutional, HEENT, cardiovascular, pulmonary, GI, , musculoskeletal, neuro, skin, endocrine and psych systems are negative, except as otherwise noted.    OBJECTIVE:                                                    /68 (BP Location: Right arm, Cuff Size: Adult Large)  Pulse 98  Temp 98.1  F (36.7  C) (Oral)  Ht 5' 6\" (1.676 m)  Wt 209 lb (94.8 kg)  SpO2 97%  BMI 33.73 kg/m2  Body mass index is 33.73 kg/(m^2).  GENERAL: healthy, alert and no distress  HENT: ear canals and TM's normal, nose and mouth without ulcers or lesions  NECK: no adenopathy, no asymmetry, masses, or scars and thyroid normal to palpation  RESP: lungs clear to auscultation - no rales, rhonchi or wheezes  CV: regular " rate and rhythm, normal S1 S2, no S3 or S4, no murmur, click or rub, no peripheral edema and peripheral pulses strong  MS: no gross musculoskeletal defects noted, no edema  Diabetic foot exam: normal DP and PT pulses, no trophic changes or ulcerative lesions and normal sensory exam    Diagnostic Test Results:  Results for orders placed or performed in visit on 04/25/17   *UA reflex to Microscopic and Culture ([place] and [facility] (except [place] and [place])   Result Value Ref Range    Color Urine Yellow     Appearance Urine Clear     Glucose Urine Negative NEG mg/dL    Bilirubin Urine Negative NEG    Ketones Urine Negative NEG mg/dL    Specific Gravity Urine 1.020 1.003 - 1.035    Blood Urine Negative NEG    pH Urine 5.5 5.0 - 7.0 pH    Protein Albumin Urine Negative NEG mg/dL    Urobilinogen Urine 0.2 0.2 - 1.0 EU/dL    Nitrite Urine Negative NEG    Leukocyte Esterase Urine Negative NEG    Source Midstream Urine    Albumin Random Urine Quantitative   Result Value Ref Range    Creatinine Urine 144 mg/dL    Albumin Urine mg/L 6 mg/L    Albumin Urine mg/g Cr 4.08 0 - 17 mg/g Cr   NEISSERIA GONORRHOEA PCR   Result Value Ref Range    Specimen Descrip Urine     N Gonorrhea PCR  NEG     Negative   Negative for N. gonorrhoeae rRNA by transcription mediated amplification.   A negative result by transcription mediated amplification does not preclude the   presence of N. gonorrhoeae infection because results are dependent on proper   and adequate collection, absence of inhibitors, and sufficient rRNA to be   detected.     CHLAMYDIA TRACHOMATIS PCR   Result Value Ref Range    Specimen Description Urine     Chlamydia Trachomatis PCR  NEG     Negative   Negative for C. trachomatis rRNA by transcription mediated amplification.   A negative result by transcription mediated amplification does not preclude the   presence of C. trachomatis infection because results are dependent on proper   and adequate collection, absence  of inhibitors, and sufficient rRNA to be   detected.          ASSESSMENT/PLAN:                                                    1. Type 2 diabetes mellitus without complication, without long-term current use of insulin (H)  Discussed approach to care and therapy for DM2, A1C currently at goal today, will start ACE and statin therapy, aspirin not yet inidcated  - *UA reflex to Microscopic and Culture ([place] and [facility] (except [place] and [place])  - NEISSERIA GONORRHOEA PCR  - CHLAMYDIA TRACHOMATIS PCR  - Albumin Random Urine Quantitative  - TSH with free T4 reflex; Future  - simvastatin (ZOCOR) 20 MG tablet; Take 1 tablet (20 mg) by mouth At Bedtime  Dispense: 90 tablet; Refill: 1  - lisinopril (PRINIVIL/ZESTRIL) 2.5 MG tablet; Take 1 tablet (2.5 mg) by mouth daily  Dispense: 90 tablet; Refill: 1  - Basic metabolic panel; Future  - FOOT EXAM  - ASPIRIN NOT PRESCRIBED (INTENTIONAL); Please choose reason not prescribed, below  Dispense: 1 each; Refill: 0    2. Urinary frequency  Will cover for urethritis with doxycycline  - doxycycline Monohydrate 100 MG TABS; Take 100 mg by mouth 2 times daily for 7 days  Dispense: 14 tablet; Refill: 0    3. Male hypogonadism  - testosterone cypionate (DEPOTESTOTERONE) 200 MG/ML injection; Inject 1 mL (200 mg) into the muscle every 14 days  Dispense: 2 mL; Refill: 3    4. Low testosterone  Refilled today, continue therapy, recheck in 3 months  - testosterone cypionate (DEPOTESTOTERONE) 200 MG/ML injection; Inject 1 mL (200 mg) into the muscle every 14 days  Dispense: 2 mL; Refill: 3      Patient Instructions   1) We will do urine tests looking for infection today but will start doxycycline twice per day for 7 days    2) Make a lab only appointment in 1-2 weeks to recheck kidney function and to check your thyroid function. This can be a lab only exam for this.    Recheck in 3 months with recheck of your testosterone, we should check your levels right between  doses    MD Andre Martin MD, MD  [facility]

## 2017-04-25 NOTE — NURSING NOTE
"Chief Complaint   Patient presents with     RECHECK       Initial /68 (BP Location: Right arm, Cuff Size: Adult Large)  Pulse 98  Temp 98.1  F (36.7  C) (Oral)  Ht 5' 6\" (1.676 m)  Wt 209 lb (94.8 kg)  SpO2 97%  BMI 33.73 kg/m2 Estimated body mass index is 33.73 kg/(m^2) as calculated from the following:    Height as of this encounter: 5' 6\" (1.676 m).    Weight as of this encounter: 209 lb (94.8 kg).  Medication Reconciliation: complete   Mirna Nogueira MA    "

## 2017-04-25 NOTE — LETTER
[facility]  [address]  [place] [address]                  [phone]   May 1, 2017    Darrell Olivares  [address] [address]      Dear Darrell,    Here is a summary of your recent test results:    All the testing that we did was normal. We will treat as we discussed for causes of the urinary symptoms that are having.     Your test results are enclosed.      Please contact me if you have any questions.           Thank you very much for choosing [facility]    Best regards,    Andre Fischer MD        Results for orders placed or performed in visit on 04/25/17   *UA reflex to Microscopic and Culture ([place] and [facility] (except [place] and [place])   Result Value Ref Range    Color Urine Yellow     Appearance Urine Clear     Glucose Urine Negative NEG mg/dL    Bilirubin Urine Negative NEG    Ketones Urine Negative NEG mg/dL    Specific Gravity Urine 1.020 1.003 - 1.035    Blood Urine Negative NEG    pH Urine 5.5 5.0 - 7.0 pH    Protein Albumin Urine Negative NEG mg/dL    Urobilinogen Urine 0.2 0.2 - 1.0 EU/dL    Nitrite Urine Negative NEG    Leukocyte Esterase Urine Negative NEG    Source Midstream Urine    Albumin Random Urine Quantitative   Result Value Ref Range    Creatinine Urine 144 mg/dL    Albumin Urine mg/L 6 mg/L    Albumin Urine mg/g Cr 4.08 0 - 17 mg/g Cr   NEISSERIA GONORRHOEA PCR   Result Value Ref Range    Specimen Descrip Urine     N Gonorrhea PCR  NEG     Negative   Negative for N. gonorrhoeae rRNA by transcription mediated amplification.   A negative result by transcription mediated amplification does not preclude the   presence of N. gonorrhoeae infection because results are dependent on proper   and adequate collection, absence of inhibitors, and sufficient rRNA to be   detected.     CHLAMYDIA TRACHOMATIS PCR   Result Value Ref Range    Specimen Description Urine     Chlamydia Trachomatis PCR  NEG     Negative    Negative for C. trachomatis rRNA by transcription mediated amplification.   A negative result by transcription mediated amplification does not preclude the   presence of C. trachomatis infection because results are dependent on proper   and adequate collection, absence of inhibitors, and sufficient rRNA to be   detected.

## 2017-04-26 LAB
CREAT UR-MCNC: 144 MG/DL
MICROALBUMIN UR-MCNC: 6 MG/L
MICROALBUMIN/CREAT UR: 4.08 MG/G CR (ref 0–17)

## 2017-04-27 LAB
C TRACH DNA SPEC QL NAA+PROBE: NORMAL
N GONORRHOEA DNA SPEC QL NAA+PROBE: NORMAL
SPECIMEN SOURCE: NORMAL
SPECIMEN SOURCE: NORMAL

## 2017-07-06 ENCOUNTER — OFFICE VISIT (OUTPATIENT)
Dept: PEDIATRICS | Facility: CLINIC | Age: 42
End: 2017-07-06
Payer: COMMERCIAL

## 2017-07-06 VITALS
DIASTOLIC BLOOD PRESSURE: 68 MMHG | HEART RATE: 98 BPM | BODY MASS INDEX: 32.95 KG/M2 | WEIGHT: 205 LBS | SYSTOLIC BLOOD PRESSURE: 100 MMHG | TEMPERATURE: 98.2 F | HEIGHT: 66 IN

## 2017-07-06 DIAGNOSIS — E11.9 TYPE 2 DIABETES MELLITUS WITHOUT COMPLICATION, WITHOUT LONG-TERM CURRENT USE OF INSULIN (H): Primary | ICD-10-CM

## 2017-07-06 DIAGNOSIS — B36.0 TINEA VERSICOLOR: ICD-10-CM

## 2017-07-06 DIAGNOSIS — R79.89 LOW TESTOSTERONE: ICD-10-CM

## 2017-07-06 DIAGNOSIS — E29.1 MALE HYPOGONADISM: ICD-10-CM

## 2017-07-06 PROCEDURE — 99214 OFFICE O/P EST MOD 30 MIN: CPT | Performed by: INTERNAL MEDICINE

## 2017-07-06 PROCEDURE — 90732 PPSV23 VACC 2 YRS+ SUBQ/IM: CPT | Performed by: INTERNAL MEDICINE

## 2017-07-06 PROCEDURE — 90471 IMMUNIZATION ADMIN: CPT | Performed by: INTERNAL MEDICINE

## 2017-07-06 RX ORDER — TESTOSTERONE CYPIONATE 200 MG/ML
200 INJECTION, SOLUTION INTRAMUSCULAR
Qty: 2 ML | Refills: 3 | Status: SHIPPED | OUTPATIENT
Start: 2017-07-06 | End: 2017-09-08

## 2017-07-06 RX ORDER — KETOCONAZOLE 20 MG/ML
SHAMPOO TOPICAL
Qty: 120 ML | Refills: 1 | Status: SHIPPED | OUTPATIENT
Start: 2017-07-06 | End: 2020-01-23

## 2017-07-06 NOTE — NURSING NOTE
"Chief Complaint   Patient presents with     Diabetes       Initial /68 (BP Location: Right arm, Cuff Size: Adult Large)  Pulse 98  Temp 98.2  F (36.8  C) (Oral)  Ht 5' 6\" (1.676 m)  Wt 205 lb (93 kg)  PF 95 L/min  BMI 33.09 kg/m2 Estimated body mass index is 33.09 kg/(m^2) as calculated from the following:    Height as of this encounter: 5' 6\" (1.676 m).    Weight as of this encounter: 205 lb (93 kg).  Medication Reconciliation: complete   Mirna Nogueira MA    "

## 2017-07-06 NOTE — PROGRESS NOTES
SUBJECTIVE:                                                    Darrell Olivares is a 41 year old male who presents to clinic today for the following health issues:      Diabetes Follow-up      Patient is checking blood sugars: not at all    Diabetic concerns: None     Symptoms of hypoglycemia (low blood sugar): none     Paresthesias (numbness or burning in feet) or sores: No     Date of last diabetic eye exam: none     Diet controlled. Due for repeat A1C. On statin and ACE    Hyperlipidemia Follow-Up      Rate your low fat/cholesterol diet?: fair    Taking statin?  Yes, no muscle aches from statin    Other lipid medications/supplements?:  none    Hypertension Follow-up      Outpatient blood pressures are not being checked.    Low Salt Diet: not monitoring salt      Amount of exercise or physical activity: 4-5 days/week for an average of greater than 60 minutes    Problems taking medications regularly: No    Medication side effects: none    Diet: regular (no restrictions)    Testosterone follow-up: has been using every 2-3 weeks, going well, helping with energy and libido. No side effects from this.      Skin: noted hypopigmented changes on the skin    Problem list and histories reviewed & adjusted, as indicated.  Additional history: as documented    Patient Active Problem List   Diagnosis     Low testosterone     Male hypogonadism     Type 2 diabetes mellitus without complication, without long-term current use of insulin (H)     History reviewed. No pertinent surgical history.    Social History   Substance Use Topics     Smoking status: Never Smoker     Smokeless tobacco: Not on file     Alcohol use Not on file     History reviewed. No pertinent family history.        Reviewed and updated as needed this visit by clinical staff       Reviewed and updated as needed this visit by Provider         ROS:  Constitutional, HEENT, cardiovascular, pulmonary, GI, , musculoskeletal, neuro, skin, endocrine and psych  "systems are negative, except as otherwise noted.    OBJECTIVE:     /68 (BP Location: Right arm, Cuff Size: Adult Large)  Pulse 98  Temp 98.2  F (36.8  C) (Oral)  Ht 5' 6\" (1.676 m)  Wt 205 lb (93 kg)  PF 95 L/min  BMI 33.09 kg/m2  Body mass index is 33.09 kg/(m^2).   Wt Readings from Last 4 Encounters:   07/06/17 205 lb (93 kg)   04/25/17 209 lb (94.8 kg)   03/30/17 210 lb (95.3 kg)   03/01/17 220 lb (99.8 kg)       GENERAL: healthy, alert and no distress  NECK: no adenopathy, no asymmetry, masses, or scars and thyroid normal to palpation  RESP: lungs clear to auscultation - no rales, rhonchi or wheezes  CV: regular rate and rhythm, normal S1 S2, no S3 or S4, no murmur, click or rub, no peripheral edema and peripheral pulses strong  ABDOMEN: soft, nontender, no hepatosplenomegaly, no masses and bowel sounds normal  MS: no gross musculoskeletal defects noted, no edema  SKIN: noted hypopigmented patches on the neck and right shoulder area no suspicious lesions or rashes  NEURO: Normal strength and tone, mentation intact and speech normal  PSYCH: mentation appears normal, affect normal/bright    Diagnostic Test Results:  pending    ASSESSMENT/PLAN:   1. Type 2 diabetes mellitus without complication, without long-term current use of insulin (H)  Will get in for eye exam, recheck A1C pending, has had some weight loss with exercise  - Hemoglobin A1c; Future  - Comprehensive metabolic panel; Future  - CBC with platelets; Future  - OPTOMETRY REFERRAL    2. Tinea versicolor  Noted on exam, will start topical therapy  - ketoconazole (NIZORAL) 2 % shampoo; Apply to the affected area and wash off after 5 minutes.  Dispense: 120 mL; Refill: 1    3. Male hypogonadism  Continue with current therapy, will get mid cycle check of testosterone  - **Testosterone Free and Total FUTURE anytime; Future  - testosterone cypionate (DEPOTESTOTERONE) 200 MG/ML injection; Inject 1 mL (200 mg) into the muscle every 14 days  Dispense: " "2 mL; Refill: 3  - Needle, Disp, 18G X 1-1/2\" MISC; 2 Devices every 14 days  Dispense: 2 each; Refill: 11  - Needle, Disp, 25G X 1-1/2\" MISC; 2 Devices every 14 days  Dispense: 2 each; Refill: 11    4. Low testosterone  - testosterone cypionate (DEPOTESTOTERONE) 200 MG/ML injection; Inject 1 mL (200 mg) into the muscle every 14 days  Dispense: 2 mL; Refill: 3  - Needle, Disp, 18G X 1-1/2\" MISC; 2 Devices every 14 days  Dispense: 2 each; Refill: 11  - Needle, Disp, 25G X 1-1/2\" MISC; 2 Devices every 14 days  Dispense: 2 each; Refill: 11      Patient Instructions   1) Lab tests next Friday to check testosterone levels- do you shot tomorrow    2) We will recheck your diabetes labs on Friday as well     3) Pneumonia shot today    4) Get in for eye exam- should have this every year.     MD Andre Martin MD, MD  [facility] [place]  "

## 2017-07-06 NOTE — MR AVS SNAPSHOT
After Visit Summary   7/6/2017    Darrell Olivares    MRN: [id number]           Patient Information     Date Of Birth          1975        Visit Information        Provider Department      7/6/2017 1:50 PM Andre Fischer MD [facility]        Today's Diagnoses     Type 2 diabetes mellitus without complication, without long-term current use of insulin (H)    -  1    Tinea versicolor        Male hypogonadism        Low testosterone          Care Instructions    1) Lab tests next Friday to check testosterone levels- do you shot tomorrow    2) We will recheck your diabetes labs on Friday as well     3) Pneumonia shot today    4) Get in for eye exam- should have this every year.     Andre Fischer MD          Follow-ups after your visit        Additional Services     OPTOMETRY REFERRAL       Your provider has referred you to: [facility]:  [place] [facility] [place] [phone]   [email or web address]    Please be aware that coverage of these services is subject to the terms and limitations of your health insurance plan.  Call member services at your health plan with any benefit or coverage questions.      Please bring the following with you to your appointment:    (1) Any X-Rays, CTs or MRIs which have been performed.  Contact the facility where they were done to arrange for  prior to your scheduled appointment.    (2) List of current medications  (3) This referral request   (4) Any documents/labs given to you for this referral                  Your next 10 appointments already scheduled     Jul 14, 2017  4:00 PM CDT   LAB with EA LAB   [facility] [place] [facility]    [address]  [address]  [place] [id number] [phone]           Patient must bring picture ID.  Patient should be prepared to give a urine specimen  Please do not eat 10-12 hours before your appointment if you are coming in fasting for labs on lipids, cholesterol, or  "glucose (sugar).  Pregnant women should follow their Care Team instructions. Water with medications is okay. Do not drink coffee or other fluids.   If you have concerns about taking  your medications, please ask at office or if scheduling via [employer], send a message by clicking on Secure Messaging, Message Your Care Team.              Future tests that were ordered for you today     Open Future Orders        Priority Expected Expires Ordered    CBC with platelets Routine  7/6/2018 7/6/2017    Hemoglobin A1c Routine  7/6/2018 7/6/2017    **Testosterone Free and Total FUTURE anytime Routine 7/6/2017 7/6/2018 7/6/2017    Comprehensive metabolic panel Routine  7/6/2018 7/6/2017            Who to contact     If you have questions or need follow up information about today's clinic visit or your schedule please contact [facility] [place] directly at [phone].  Normal or non-critical lab and imaging results will be communicated to you by [employer], letter or phone within 4 business days after the clinic has received the results. If you do not hear from us within 7 days, please contact the clinic through [employer] or phone. If you have a critical or abnormal lab result, we will notify you by phone as soon as possible.  Submit refill requests through [employer] or call your pharmacy and they will forward the refill request to us. Please allow 3 business days for your refill to be completed.          Additional Information About Your Visit        [employer] Information     [employer] lets you send messages to your doctor, view your test results, renew your prescriptions, schedule appointments and more. To sign up, go to [email or web address] . Click on \"Log in\" on the left side of the screen, which will take you to the Welcome page. Then click on \"Sign up Now\" on the right side of the page.     You will be asked to enter the access code listed below, as well as some personal information. Please follow the directions to create " "your username and password.     Your access code is: [email or web address]  Expires: 10/4/2017  2:14 PM     Your access code will  in 90 days. If you need help or a new code, please call your [facility] or [phone].        Care EveryWhere ID     This is your Care EveryWhere ID. This could be used by other organizations to access your [facility] medical records  [id number]        Your Vitals Were     Pulse Temperature Height Peak Flow BMI (Body Mass Index)       98 98.2  F (36.8  C) (Oral) 5' 6\" (1.676 m) 95 L/min 33.09 kg/m2        Blood Pressure from Last 3 Encounters:   17 100/68   17 122/68   17 104/68    Weight from Last 3 Encounters:   17 205 lb (93 kg)   17 209 lb (94.8 kg)   17 210 lb (95.3 kg)              We Performed the Following     OPTOMETRY REFERRAL          Today's Medication Changes          These changes are accurate as of: 17  2:14 PM.  If you have any questions, ask your nurse or doctor.               Start taking these medicines.        Dose/Directions    ketoconazole 2 % shampoo   Commonly known as:  NIZORAL   Used for:  Tinea versicolor   Started by:  Andre Fischer MD        Apply to the affected area and wash off after 5 minutes.   Quantity:  120 mL   Refills:  1            Where to get your medicines      These medications were sent to [id number] - [place], [place] - [address] [address]     Phone:  [phone]     ketoconazole 2 % shampoo         Some of these will need a paper prescription and others can be bought over the counter.  Ask your nurse if you have questions.     Bring a paper prescription for each of these medications     testosterone cypionate 200 MG/ML injection                Primary Care Provider Office Phone # Fax #    Andre Fischer [phone]       [facility] [address] [place] [address]        Equal Access " to Services     GRACIE BALDWIN : Jey Isbell, wacheli villalpando, qaybta kaalmachauncey marsh. So [facility] [phone].    ATENCIÓN: Si habla mateus, tiene a abdi disposición servicios gratuitos de asistencia lingüística. Llame al [phone].    We comply with applicable federal civil rights laws and [place] laws. We do not discriminate on the basis of race, color, national origin, age, disability sex, sexual orientation or gender identity.            Thank you!     Thank you for choosing [facility] [place]  for your care. Our goal is always to provide you with excellent care. Hearing back from our patients is one way we can continue to improve our services. Please take a few minutes to complete the written survey that you may receive in the mail after your visit with us. Thank you!             Your Updated Medication List - Protect others around you: Learn how to safely use, store and throw away your medicines at www.disposemymeds.org.          This list is accurate as of: 7/6/17  2:14 PM.  Always use your most recent med list.                   Brand Name Dispense Instructions for use Diagnosis    ASPIRIN NOT PRESCRIBED    INTENTIONAL    1 each    Please choose reason not prescribed, below    Type 2 diabetes mellitus without complication, without long-term current use of insulin (H)       ketoconazole 2 % shampoo    NIZORAL    120 mL    Apply to the affected area and wash off after 5 minutes.    Tinea versicolor       lisinopril 2.5 MG tablet    PRINIVIL/Zestril    90 tablet    Take 1 tablet (2.5 mg) by mouth daily    Type 2 diabetes mellitus without complication, without long-term current use of insulin (H)       simvastatin 20 MG tablet    ZOCOR    90 tablet    Take 1 tablet (20 mg) by mouth At Bedtime    Type 2 diabetes mellitus without complication, without long-term current use of insulin (H)       testosterone cypionate 200 MG/ML injection     DEPOTESTOTERONE    2 mL    Inject 1 mL (200 mg) into the muscle every 14 days    Male hypogonadism, Low testosterone

## 2017-07-06 NOTE — PATIENT INSTRUCTIONS
1) Lab tests next Friday to check testosterone levels- do you shot tomorrow    2) We will recheck your diabetes labs on Friday as well     3) Pneumonia shot today    4) Get in for eye exam- should have this every year.     Andre Fischer MD

## 2017-07-18 DIAGNOSIS — E29.1 MALE HYPOGONADISM: ICD-10-CM

## 2017-07-18 DIAGNOSIS — E11.9 TYPE 2 DIABETES MELLITUS WITHOUT COMPLICATION, WITHOUT LONG-TERM CURRENT USE OF INSULIN (H): ICD-10-CM

## 2017-07-18 LAB
ERYTHROCYTE [DISTWIDTH] IN BLOOD BY AUTOMATED COUNT: 12.1 % (ref 10–15)
HBA1C MFR BLD: 6.4 % (ref 4.3–6)
HCT VFR BLD AUTO: 49.2 % (ref 40–53)
HGB BLD-MCNC: 16.6 G/DL (ref 13.3–17.7)
MCH RBC QN AUTO: 30.9 PG (ref 26.5–33)
MCHC RBC AUTO-ENTMCNC: 33.7 G/DL (ref 31.5–36.5)
MCV RBC AUTO: 91 FL (ref 78–100)
PLATELET # BLD AUTO: 214 10E9/L (ref 150–450)
RBC # BLD AUTO: 5.38 10E12/L (ref 4.4–5.9)
WBC # BLD AUTO: 10.2 10E9/L (ref 4–11)

## 2017-07-18 PROCEDURE — 36415 COLL VENOUS BLD VENIPUNCTURE: CPT | Performed by: INTERNAL MEDICINE

## 2017-07-18 PROCEDURE — 83036 HEMOGLOBIN GLYCOSYLATED A1C: CPT | Performed by: INTERNAL MEDICINE

## 2017-07-18 PROCEDURE — 85027 COMPLETE CBC AUTOMATED: CPT | Performed by: INTERNAL MEDICINE

## 2017-07-18 PROCEDURE — 84403 ASSAY OF TOTAL TESTOSTERONE: CPT | Performed by: INTERNAL MEDICINE

## 2017-07-18 PROCEDURE — 80053 COMPREHEN METABOLIC PANEL: CPT | Performed by: INTERNAL MEDICINE

## 2017-07-18 PROCEDURE — 84270 ASSAY OF SEX HORMONE GLOBUL: CPT | Performed by: INTERNAL MEDICINE

## 2017-07-19 LAB
ALBUMIN SERPL-MCNC: 4.1 G/DL (ref 3.4–5)
ALP SERPL-CCNC: 90 U/L (ref 40–150)
ALT SERPL W P-5'-P-CCNC: 60 U/L (ref 0–70)
ANION GAP SERPL CALCULATED.3IONS-SCNC: 6 MMOL/L (ref 3–14)
AST SERPL W P-5'-P-CCNC: 18 U/L (ref 0–45)
BILIRUB SERPL-MCNC: 0.5 MG/DL (ref 0.2–1.3)
BUN SERPL-MCNC: 15 MG/DL (ref 7–30)
CALCIUM SERPL-MCNC: 8.8 MG/DL (ref 8.5–10.1)
CHLORIDE SERPL-SCNC: 103 MMOL/L (ref 94–109)
CO2 SERPL-SCNC: 30 MMOL/L (ref 20–32)
CREAT SERPL-MCNC: 1.02 MG/DL (ref 0.66–1.25)
GFR SERPL CREATININE-BSD FRML MDRD: 80 ML/MIN/1.7M2
GLUCOSE SERPL-MCNC: 154 MG/DL (ref 70–99)
POTASSIUM SERPL-SCNC: 4.3 MMOL/L (ref 3.4–5.3)
PROT SERPL-MCNC: 7.2 G/DL (ref 6.8–8.8)
SODIUM SERPL-SCNC: 139 MMOL/L (ref 133–144)

## 2017-07-21 LAB
SHBG SERPL-SCNC: 16 NMOL/L (ref 11–80)
TESTOST FREE SERPL-MCNC: 9.83 NG/DL (ref 4.7–24.4)
TESTOST SERPL-MCNC: 348 NG/DL (ref 240–950)

## 2017-09-08 ENCOUNTER — OFFICE VISIT (OUTPATIENT)
Dept: PEDIATRICS | Facility: CLINIC | Age: 42
End: 2017-09-08
Payer: COMMERCIAL

## 2017-09-08 VITALS
OXYGEN SATURATION: 96 % | BODY MASS INDEX: 32.95 KG/M2 | SYSTOLIC BLOOD PRESSURE: 114 MMHG | HEIGHT: 66 IN | TEMPERATURE: 97.9 F | HEART RATE: 76 BPM | WEIGHT: 205 LBS | DIASTOLIC BLOOD PRESSURE: 66 MMHG

## 2017-09-08 DIAGNOSIS — E11.9 TYPE 2 DIABETES MELLITUS WITHOUT COMPLICATION, WITHOUT LONG-TERM CURRENT USE OF INSULIN (H): Primary | ICD-10-CM

## 2017-09-08 DIAGNOSIS — M25.512 ACUTE PAIN OF LEFT SHOULDER: ICD-10-CM

## 2017-09-08 DIAGNOSIS — E29.1 MALE HYPOGONADISM: ICD-10-CM

## 2017-09-08 PROCEDURE — 99214 OFFICE O/P EST MOD 30 MIN: CPT | Performed by: INTERNAL MEDICINE

## 2017-09-08 RX ORDER — OXYCODONE HYDROCHLORIDE 5 MG/1
5 TABLET ORAL EVERY 4 HOURS PRN
Qty: 20 TABLET | Refills: 0 | Status: SHIPPED | OUTPATIENT
Start: 2017-09-08 | End: 2017-09-08

## 2017-09-08 RX ORDER — CYCLOBENZAPRINE HCL 10 MG
5-10 TABLET ORAL
Qty: 30 TABLET | Refills: 1 | Status: SHIPPED | OUTPATIENT
Start: 2017-09-08 | End: 2019-05-08

## 2017-09-08 RX ORDER — TESTOSTERONE CYPIONATE 200 MG/ML
200 INJECTION, SOLUTION INTRAMUSCULAR
Qty: 2 ML | Refills: 3 | Status: SHIPPED | OUTPATIENT
Start: 2017-09-08 | End: 2017-11-01

## 2017-09-08 NOTE — PROGRESS NOTES
"  SUBJECTIVE:   Darrell Olivares is a 42 year old male who presents to clinic today for the following health issues:      Follow up    Left shoulder dislocated about 18 years ago, still bothers at times. History of easy dislocation in the past. He has been having ongoing intermittent pain, worse after lifting with increased spasm into the neck at times. No weakness of the hands or paresthesias.     Diabetes: has been well controlled, has not yet had an eye exam.     Low T: has been having improvement in ED and symptoms on the testosterone, needs refill. No known side effects.       Problem list and histories reviewed & adjusted, as indicated.  Additional history: as documented    Patient Active Problem List   Diagnosis     Low testosterone     Male hypogonadism     Type 2 diabetes mellitus without complication, without long-term current use of insulin (H)     History reviewed. No pertinent surgical history.    Social History   Substance Use Topics     Smoking status: Never Smoker     Smokeless tobacco: Never Used     Alcohol use Not on file     History reviewed. No pertinent family history.          Reviewed and updated as needed this visit by clinical staffAllergies       Reviewed and updated as needed this visit by Provider         ROS:  Constitutional, HEENT, cardiovascular, pulmonary, GI, , musculoskeletal, neuro, skin, endocrine and psych systems are negative, except as otherwise noted.      OBJECTIVE:   /66 (BP Location: Right arm, Cuff Size: Adult Large)  Pulse 76  Temp 97.9  F (36.6  C) (Oral)  Ht 5' 6\" (1.676 m)  Wt 205 lb (93 kg)  SpO2 96%  BMI 33.09 kg/m2  Body mass index is 33.09 kg/(m^2).  GENERAL: healthy, alert and no distress  NECK: no adenopathy, no asymmetry, masses, or scars and thyroid normal to palpation  RESP: lungs clear to auscultation - no rales, rhonchi or wheezes  CV: regular rate and rhythm, normal S1 S2, no S3 or S4, no murmur, click or rub, no peripheral edema and " peripheral pulses strong  ABDOMEN: soft, nontender, no hepatosplenomegaly, no masses and bowel sounds normal  MS:normal ROM of the shoulder, noted increased paraspinal muscle tenderness on the left with spasm into the neck  SKIN: no suspicious lesions or rashes  NEURO: Normal strength and tone, mentation intact and speech normal    Diagnostic Test Results:  none     ASSESSMENT/PLAN:     1. Type 2 diabetes mellitus without complication, without long-term current use of insulin (H)  Due for eye exam   - OPTOMETRY REFERRAL    2. Acute pain of left shoulder  Will trial flexeril when needed  - cyclobenzaprine (FLEXERIL) 10 MG tablet; Take 0.5-1 tablets (5-10 mg) by mouth nightly as needed for muscle spasms  Dispense: 30 tablet; Refill: 1    3. Male hypogonadism  - testosterone cypionate (DEPOTESTOTERONE) 200 MG/ML injection; Inject 1 mL (200 mg) into the muscle every 14 days  Dispense: 2 mL; Refill: 3      Patient Instructions   1) Flexeril for neck and shoulder pain when gets bad    2) We will recheck your testosterone and diabetes labs in November    Get your flu shot    MD Andre Martin MD, MD  [facility]

## 2017-09-08 NOTE — NURSING NOTE
"Chief Complaint   Patient presents with     RECHECK       Initial /66 (BP Location: Right arm, Cuff Size: Adult Large)  Pulse 76  Temp 97.9  F (36.6  C) (Oral)  Ht 5' 6\" (1.676 m)  Wt 205 lb (93 kg)  SpO2 96%  BMI 33.09 kg/m2 Estimated body mass index is 33.09 kg/(m^2) as calculated from the following:    Height as of this encounter: 5' 6\" (1.676 m).    Weight as of this encounter: 205 lb (93 kg).  Medication Reconciliation: complete   Mirna Nogueira MA    "

## 2017-09-08 NOTE — PATIENT INSTRUCTIONS
1) Flexeril for neck and shoulder pain when gets bad    2) We will recheck your testosterone and diabetes labs in November    Get your flu shot    Andre Fischer MD

## 2017-09-08 NOTE — MR AVS SNAPSHOT
After Visit Summary   9/8/2017    Darrell Olivares    MRN: [id number]           Patient Information     Date Of Birth          1975        Visit Information        Provider Department      9/8/2017 11:10 AM Andre Fischer MD [facility]        Today's Diagnoses     Type 2 diabetes mellitus without complication, without long-term current use of insulin (H)    -  1    Acute pain of left shoulder          Care Instructions    1) Flexeril for neck and shoulder pain when gets bad    2) We will recheck your testosterone and diabetes labs in November    Get your flu shot    Andre Fischer MD          Follow-ups after your visit        Additional Services     OPTOMETRY REFERRAL       Your provider has referred you to: [facility]:  [facility] [phone]   [email or web address]    Please be aware that coverage of these services is subject to the terms and limitations of your health insurance plan.  Call member services at your health plan with any benefit or coverage questions.      Please bring the following with you to your appointment:    (1) Any X-Rays, CTs or MRIs which have been performed.  Contact the facility where they were done to arrange for  prior to your scheduled appointment.    (2) List of current medications  (3) This referral request   (4) Any documents/labs given to you for this referral                  Who to contact     If you have questions or need follow up information about today's clinic visit or your schedule please contact [facility] directly at [phone].  Normal or non-critical lab and imaging results will be communicated to you by MyChart, letter or phone within 4 business days after the clinic has received the results. If you do not hear from us within 7 days, please contact the clinic through MyChart or phone. If you have a critical or abnormal lab result, we will notify you by phone as soon as possible.  Submit  "refill requests through [employer] or call your pharmacy and they will forward the refill request to us. Please allow 3 business days for your refill to be completed.          Additional Information About Your Visit        [facility] Information     [employer] lets you send messages to your doctor, view your test results, renew your prescriptions, schedule appointments and more. To sign up, go to [email or web address] . Click on \"Log in\" on the left side of the screen, which will take you to the Welcome page. Then click on \"Sign up Now\" on the right side of the page.     You will be asked to enter the access code listed below, as well as some personal information. Please follow the directions to create your username and password.     Your access code is: [email or web address]  Expires: 10/4/2017  2:14 PM     Your access code will  in 90 days. If you need help or a new code, please call your [place] clinic or [phone].        Care EveryWhere ID     This is your Care EveryWhere ID. This could be used by other organizations to access your [place] medical records  [id number]        Your Vitals Were     Pulse Temperature Height Pulse Oximetry BMI (Body Mass Index)       76 97.9  F (36.6  C) (Oral) 5' 6\" (1.676 m) 96% 33.09 kg/m2        Blood Pressure from Last 3 Encounters:   17 114/66   17 100/68   17 122/68    Weight from Last 3 Encounters:   17 205 lb (93 kg)   17 205 lb (93 kg)   17 209 lb (94.8 kg)              We Performed the Following     OPTOMETRY REFERRAL          Today's Medication Changes          These changes are accurate as of: 17 11:27 AM.  If you have any questions, ask your nurse or doctor.               Start taking these medicines.        Dose/Directions    cyclobenzaprine 10 MG tablet   Commonly known as:  FLEXERIL   Used for:  Acute pain of left shoulder   Started by:  Andre Fischer MD        Dose:  5-10 mg   Take 0.5-1 tablets (5-10 mg) by mouth nightly " as needed for muscle spasms   Quantity:  30 tablet   Refills:  1            Where to get your medicines      These medications were sent to [id number] - [place], [place] - [address]  [address] [place] [address]     Phone:  [phone]     cyclobenzaprine 10 MG tablet                Primary Care Provider Office Phone # Fax #    Andre Fischer [phone] [address] [place] [address]        Equal Access to Services     [facility]: Hadii aad ku hadasho Soomaali, waaxda luqadaha, qaybta kaalmada adeegyada, waxay idiin hayaan adeeg kharash layulissa . So [facility] [phone].    ATENCIÓN: Si habla español, tiene a abdi disposición servicios gratuitos de asistencia lingüística. [facility] [phone].    We comply with applicable federal civil rights laws and [place] laws. We do not discriminate on the basis of race, color, national origin, age, disability sex, sexual orientation or gender identity.            Thank you!     Thank you for choosing [facility]  for your care. Our goal is always to provide you with excellent care. Hearing back from our patients is one way we can continue to improve our services. Please take a few minutes to complete the written survey that you may receive in the mail after your visit with us. Thank you!             Your Updated Medication List - Protect others around you: Learn how to safely use, store and throw away your medicines at www.disposemymeds.org.          This list is accurate as of: 9/8/17 11:27 AM.  Always use your most recent med list.                   Brand Name Dispense Instructions for use Diagnosis    ASPIRIN NOT PRESCRIBED    INTENTIONAL    1 each    Please choose reason not prescribed, below    Type 2 diabetes mellitus without complication, without long-term current use of insulin (H)       cyclobenzaprine 10 MG tablet    FLEXERIL    30 tablet    Take 0.5-1 tablets (5-10  "mg) by mouth nightly as needed for muscle spasms    Acute pain of left shoulder       ketoconazole 2 % shampoo    NIZORAL    120 mL    Apply to the affected area and wash off after 5 minutes.    Tinea versicolor       lisinopril 2.5 MG tablet    PRINIVIL/Zestril    90 tablet    Take 1 tablet (2.5 mg) by mouth daily    Type 2 diabetes mellitus without complication, without long-term current use of insulin (H)       Needle (Disp) 18G X 1-1/2\" Misc     2 each    2 Devices every 14 days    Male hypogonadism, Low testosterone       Needle (Disp) 25G X 1-1/2\" Misc     2 each    2 Devices every 14 days    Low testosterone, Male hypogonadism       simvastatin 20 MG tablet    ZOCOR    90 tablet    Take 1 tablet (20 mg) by mouth At Bedtime    Type 2 diabetes mellitus without complication, without long-term current use of insulin (H)       testosterone cypionate 200 MG/ML injection    DEPOTESTOTERONE    2 mL    Inject 1 mL (200 mg) into the muscle every 14 days    Male hypogonadism, Low testosterone         "

## 2017-11-01 ENCOUNTER — OFFICE VISIT (OUTPATIENT)
Dept: PEDIATRICS | Facility: CLINIC | Age: 42
End: 2017-11-01
Payer: COMMERCIAL

## 2017-11-01 VITALS
OXYGEN SATURATION: 97 % | HEIGHT: 66 IN | TEMPERATURE: 97.5 F | BODY MASS INDEX: 33.11 KG/M2 | DIASTOLIC BLOOD PRESSURE: 70 MMHG | HEART RATE: 82 BPM | WEIGHT: 206 LBS | SYSTOLIC BLOOD PRESSURE: 118 MMHG

## 2017-11-01 DIAGNOSIS — E29.1 MALE HYPOGONADISM: ICD-10-CM

## 2017-11-01 DIAGNOSIS — E11.9 TYPE 2 DIABETES MELLITUS WITHOUT COMPLICATION, WITHOUT LONG-TERM CURRENT USE OF INSULIN (H): Primary | ICD-10-CM

## 2017-11-01 LAB
ALBUMIN SERPL-MCNC: 4 G/DL (ref 3.4–5)
ALP SERPL-CCNC: 75 U/L (ref 40–150)
ALT SERPL W P-5'-P-CCNC: 72 U/L (ref 0–70)
ANION GAP SERPL CALCULATED.3IONS-SCNC: 8 MMOL/L (ref 3–14)
AST SERPL W P-5'-P-CCNC: 23 U/L (ref 0–45)
BILIRUB DIRECT SERPL-MCNC: 0.1 MG/DL (ref 0–0.2)
BILIRUB SERPL-MCNC: 0.7 MG/DL (ref 0.2–1.3)
BUN SERPL-MCNC: 14 MG/DL (ref 7–30)
CALCIUM SERPL-MCNC: 8.7 MG/DL (ref 8.5–10.1)
CHLORIDE SERPL-SCNC: 106 MMOL/L (ref 94–109)
CO2 SERPL-SCNC: 25 MMOL/L (ref 20–32)
CREAT SERPL-MCNC: 1.14 MG/DL (ref 0.66–1.25)
GFR SERPL CREATININE-BSD FRML MDRD: 70 ML/MIN/1.7M2
GLUCOSE SERPL-MCNC: 149 MG/DL (ref 70–99)
HBA1C MFR BLD: 6.6 % (ref 4.3–6)
POTASSIUM SERPL-SCNC: 4.1 MMOL/L (ref 3.4–5.3)
PROLACTIN SERPL-MCNC: 12 UG/L (ref 2–18)
PROT SERPL-MCNC: 7.2 G/DL (ref 6.8–8.8)
SODIUM SERPL-SCNC: 139 MMOL/L (ref 133–144)
TSH SERPL DL<=0.005 MIU/L-ACNC: 0.76 MU/L (ref 0.4–4)

## 2017-11-01 PROCEDURE — 83036 HEMOGLOBIN GLYCOSYLATED A1C: CPT | Performed by: INTERNAL MEDICINE

## 2017-11-01 PROCEDURE — 99214 OFFICE O/P EST MOD 30 MIN: CPT | Performed by: INTERNAL MEDICINE

## 2017-11-01 PROCEDURE — 80076 HEPATIC FUNCTION PANEL: CPT | Performed by: INTERNAL MEDICINE

## 2017-11-01 PROCEDURE — 80048 BASIC METABOLIC PNL TOTAL CA: CPT | Performed by: INTERNAL MEDICINE

## 2017-11-01 PROCEDURE — 36415 COLL VENOUS BLD VENIPUNCTURE: CPT | Performed by: INTERNAL MEDICINE

## 2017-11-01 PROCEDURE — 84443 ASSAY THYROID STIM HORMONE: CPT | Performed by: INTERNAL MEDICINE

## 2017-11-01 PROCEDURE — 84403 ASSAY OF TOTAL TESTOSTERONE: CPT | Performed by: INTERNAL MEDICINE

## 2017-11-01 PROCEDURE — 84146 ASSAY OF PROLACTIN: CPT | Performed by: INTERNAL MEDICINE

## 2017-11-01 RX ORDER — SIMVASTATIN 20 MG
20 TABLET ORAL AT BEDTIME
Qty: 90 TABLET | Refills: 1 | Status: SHIPPED | OUTPATIENT
Start: 2017-11-01 | End: 2018-03-07

## 2017-11-01 RX ORDER — TESTOSTERONE CYPIONATE 200 MG/ML
200 INJECTION, SOLUTION INTRAMUSCULAR
Qty: 2 ML | Refills: 3 | Status: SHIPPED | OUTPATIENT
Start: 2017-11-01 | End: 2018-02-07

## 2017-11-01 RX ORDER — LISINOPRIL 2.5 MG/1
2.5 TABLET ORAL DAILY
Qty: 90 TABLET | Refills: 1 | Status: SHIPPED | OUTPATIENT
Start: 2017-11-01 | End: 2018-03-07

## 2017-11-01 NOTE — PATIENT INSTRUCTIONS
1) Labs downstairs today    2) Refilled testosterone    3) Let me know if other questions or concerns come up    Recheck in 6 months    Andre Fischer MD

## 2017-11-01 NOTE — NURSING NOTE
"Chief Complaint   Patient presents with     Diabetes       Initial /70 (BP Location: Right arm, Cuff Size: Adult Large)  Pulse 82  Temp 97.5  F (36.4  C) (Oral)  Ht 5' 6\" (1.676 m)  Wt 206 lb (93.4 kg)  SpO2 97%  BMI 33.25 kg/m2 Estimated body mass index is 33.25 kg/(m^2) as calculated from the following:    Height as of this encounter: 5' 6\" (1.676 m).    Weight as of this encounter: 206 lb (93.4 kg).  Medication Reconciliation: complete   Mirna Nogueira MA    "

## 2017-11-01 NOTE — LETTER
[facility]  [address]  [place] [address]                  [phone]   November 3, 2017    Darrell GUEVARA Olivares  [address]   [place] [address]      Dear Darrell,    Here is a summary of your recent test results:    Your testosterone was slightly high due to you just having the shot before the testing. We will continue on the same dosing.     Your A1C was slightly up from last time. Continue with low carbohydrate diet and regular exercise.     Your test results are enclosed.      Please contact me if you have any questions.           Thank you very much for choosing [facility]    Best regards,    Andre Fischer MD        Results for orders placed or performed in visit on 11/01/17   Prolactin   Result Value Ref Range    Prolactin 12 2 - 18 ug/L   Hemoglobin A1c   Result Value Ref Range    Hemoglobin A1C 6.6 (H) 4.3 - 6.0 %   Basic metabolic panel   Result Value Ref Range    Sodium 139 133 - 144 mmol/L    Potassium 4.1 3.4 - 5.3 mmol/L    Chloride 106 94 - 109 mmol/L    Carbon Dioxide 25 20 - 32 mmol/L    Anion Gap 8 3 - 14 mmol/L    Glucose 149 (H) 70 - 99 mg/dL    Urea Nitrogen 14 7 - 30 mg/dL    Creatinine 1.14 0.66 - 1.25 mg/dL    GFR Estimate 70 >60 mL/min/1.7m2    GFR Estimate If Black 85 >60 mL/min/1.7m2    Calcium 8.7 8.5 - 10.1 mg/dL   Testosterone, total   Result Value Ref Range    Testosterone Total 1052 (H) 240 - 950 ng/dL   Hepatic panel (Albumin, ALT, AST, Bili, Alk Phos, TP)   Result Value Ref Range    Bilirubin Direct 0.1 0.0 - 0.2 mg/dL    Bilirubin Total 0.7 0.2 - 1.3 mg/dL    Albumin 4.0 3.4 - 5.0 g/dL    Protein Total 7.2 6.8 - 8.8 g/dL    Alkaline Phosphatase 75 40 - 150 U/L    ALT 72 (H) 0 - 70 U/L    AST 23 0 - 45 U/L   TSH with free T4 reflex   Result Value Ref Range    TSH 0.76 0.40 - 4.00 mU/L

## 2017-11-01 NOTE — MR AVS SNAPSHOT
"              After Visit Summary   11/1/2017    Darrell Olivares    MRN: [id number]           Patient Information     Date Of Birth          1975        Visit Information        Provider Department      11/1/2017 11:10 AM [facility]        Today's Diagnoses     Type 2 diabetes mellitus without complication, without long-term current use of insulin (H)    -  1    Male hypogonadism          Care Instructions    1) Labs downstairs today    2) Refilled testosterone    3) Let me know if other questions or concerns come up    Recheck in 6 months    Andre Fischer MD          Follow-ups after your visit        Follow-up notes from your care team     Return in about 6 months (around 5/1/2018).      Who to contact     If you have questions or need follow up information about today's clinic visit or your schedule please contact [facility] directly at [phone].  Normal or non-critical lab and imaging results will be communicated to you by MyChart, letter or phone within 4 business days after the clinic has received the results. If you do not hear from us within 7 days, please contact the clinic through [employer] or phone. If you have a critical or abnormal lab result, we will notify you by phone as soon as possible.  Submit refill requests through [employer] or call your pharmacy and they will forward the refill request to us. Please allow 3 business days for your refill to be completed.          Additional Information About Your Visit        MyChart Information     [employer] lets you send messages to your doctor, view your test results, renew your prescriptions, schedule appointments and more. To sign up, go to [email or web address] . Click on \"Log in\" on the left side of the screen, which will take you to the Welcome page. Then click on \"Sign up Now\" on the right side of the page.     You will be asked to enter the access code listed below, as well as some personal " "information. Please follow the directions to create your username and password.     Your access code is: [email or web address]  Expires: 2018 11:34 AM     Your access code will  in 90 days. If you need help or a new code, please call your [place] clinic or [phone].        Care EveryWhere ID     This is your Care EveryWhere ID. This could be used by other organizations to access your [place] medical records  [id number]        Your Vitals Were     Pulse Temperature Height Pulse Oximetry BMI (Body Mass Index)       82 97.5  F (36.4  C) (Oral) 5' 6\" (1.676 m) 97% 33.25 kg/m2        Blood Pressure from Last 3 Encounters:   17 118/70   17 114/66   17 100/68    Weight from Last 3 Encounters:   17 206 lb (93.4 kg)   17 205 lb (93 kg)   17 205 lb (93 kg)              We Performed the Following     Basic metabolic panel     Hemoglobin A1c     Hepatic panel (Albumin, ALT, AST, Bili, Alk Phos, TP)     Prolactin     Testosterone, total          Where to get your medicines      These medications were sent to [id number] - [place], [place] - [address]  [address] [address]     Phone:  [phone]     lisinopril 2.5 MG tablet    simvastatin 20 MG tablet         Some of these will need a paper prescription and others can be bought over the counter.  Ask your nurse if you have questions.     Bring a paper prescription for each of these medications     testosterone cypionate 200 MG/ML injection          Primary Care Provider Office Phone # Fax #    Andre Fischer [phone] [address] [place] [address]        Equal Access to Services     JOHNY BALDWIN AH: Jey Isbell, anushka villalpando, jaqui martínez, chauncey schneider. So [facility] [phone].    ATENCIÓN: Si habla español, tiene a abdi disposición servicios gratuitos de asistencia lingüística. Llame " "al [phone].    We comply with applicable federal civil rights laws and [place] laws. We do not discriminate on the basis of race, color, national origin, age, disability, sex, sexual orientation, or gender identity.            Thank you!     Thank you for choosing [facility] [place]  for your care. Our goal is always to provide you with excellent care. Hearing back from our patients is one way we can continue to improve our services. Please take a few minutes to complete the written survey that you may receive in the mail after your visit with us. Thank you!             Your Updated Medication List - Protect others around you: Learn how to safely use, store and throw away your medicines at www.disposemymeds.org.          This list is accurate as of: 11/1/17 11:34 AM.  Always use your most recent med list.                   Brand Name Dispense Instructions for use Diagnosis    ASPIRIN NOT PRESCRIBED    INTENTIONAL    1 each    Please choose reason not prescribed, below    Type 2 diabetes mellitus without complication, without long-term current use of insulin (H)       cyclobenzaprine 10 MG tablet    FLEXERIL    30 tablet    Take 0.5-1 tablets (5-10 mg) by mouth nightly as needed for muscle spasms    Acute pain of left shoulder       ketoconazole 2 % shampoo    NIZORAL    120 mL    Apply to the affected area and wash off after 5 minutes.    Tinea versicolor       lisinopril 2.5 MG tablet    PRINIVIL/Zestril    90 tablet    Take 1 tablet (2.5 mg) by mouth daily    Type 2 diabetes mellitus without complication, without long-term current use of insulin (H)       Needle (Disp) 18G X 1-1/2\" Misc     2 each    2 Devices every 14 days    Male hypogonadism, Low testosterone       Needle (Disp) 25G X 1-1/2\" Misc     2 each    2 Devices every 14 days    Low testosterone, Male hypogonadism       simvastatin 20 MG tablet    ZOCOR    90 tablet    Take 1 tablet (20 mg) by mouth At Bedtime    Type 2 diabetes mellitus " without complication, without long-term current use of insulin (H)       testosterone cypionate 200 MG/ML injection    DEPOTESTOTERONE    2 mL    Inject 1 mL (200 mg) into the muscle every 14 days    Male hypogonadism

## 2017-11-01 NOTE — PROGRESS NOTES
"  SUBJECTIVE:   Darrell Olivares is a 42 year old male who presents to clinic today for the following health issues:      Diabetes Follow-up      Patient is checking blood sugars: not at all    Diabetic concerns: None     Symptoms of hypoglycemia (low blood sugar): none     Paresthesias (numbness or burning in feet) or sores: No     Date of last diabetic eye exam: Not this year.         Amount of exercise or physical activity: 4-5 days/week for an average of 30-45 minutes    Problems taking medications regularly: No    Medication side effects: none    Diet: regular (no restrictions)    Has been taking statin and ACE. Tolerating medications well without side effects.     Low testosterone: has been doing the injections, working well without side effects. No localized irritation or swelling. Notes that will get fatigued if late on dosing medication. Due for labs today.      Problem list and histories reviewed & adjusted, as indicated.  Additional history: as documented    Patient Active Problem List   Diagnosis     Low testosterone     Male hypogonadism     Type 2 diabetes mellitus without complication, without long-term current use of insulin (H)     History reviewed. No pertinent surgical history.    Social History   Substance Use Topics     Smoking status: Never Smoker     Smokeless tobacco: Never Used     Alcohol use Not on file     History reviewed. No pertinent family history.          Reviewed and updated as needed this visit by clinical staff     Reviewed and updated as needed this visit by Provider         ROS:  Constitutional, HEENT, cardiovascular, pulmonary, GI, , musculoskeletal, neuro, skin, endocrine and psych systems are negative, except as otherwise noted.      OBJECTIVE:   /70 (BP Location: Right arm, Cuff Size: Adult Large)  Pulse 82  Temp 97.5  F (36.4  C) (Oral)  Ht 5' 6\" (1.676 m)  Wt 206 lb (93.4 kg)  SpO2 97%  BMI 33.25 kg/m2  Body mass index is 33.25 kg/(m^2).   Wt Readings " from Last 4 Encounters:   11/01/17 206 lb (93.4 kg)   09/08/17 205 lb (93 kg)   07/06/17 205 lb (93 kg)   04/25/17 209 lb (94.8 kg)       GENERAL: healthy, alert and no distress  EYES: Eyes grossly normal to inspection, PERRL and conjunctivae and sclerae normal  HENT: ear canals and TM's normal, nose and mouth without ulcers or lesions  NECK: no adenopathy, no asymmetry, masses, or scars and thyroid normal to palpation  RESP: lungs clear to auscultation - no rales, rhonchi or wheezes  CV: regular rate and rhythm, normal S1 S2, no S3 or S4, no murmur, click or rub, no peripheral edema and peripheral pulses strong  ABDOMEN: soft, nontender, no hepatosplenomegaly, no masses and bowel sounds normal  MS: no gross musculoskeletal defects noted, no edema  SKIN: no suspicious lesions or rashes  NEURO: Normal strength and tone, mentation intact and speech normal  PSYCH: mentation appears normal, affect normal/bright    Diagnostic Test Results:  pending    ASSESSMENT/PLAN:     1. Male hypogonadism  Will check labs today and continue therapy as working well  - Prolactin  - testosterone cypionate (DEPOTESTOTERONE) 200 MG/ML injection; Inject 1 mL (200 mg) into the muscle every 14 days  Dispense: 2 mL; Refill: 3  - Testosterone, total  - Hepatic panel (Albumin, ALT, AST, Bili, Alk Phos, TP)    2. Type 2 diabetes mellitus without complication, without long-term current use of insulin (H)  Will recheck labs and continue ACE and statin  - Hemoglobin A1c  - Basic metabolic panel  - lisinopril (PRINIVIL/ZESTRIL) 2.5 MG tablet; Take 1 tablet (2.5 mg) by mouth daily  Dispense: 90 tablet; Refill: 1  - simvastatin (ZOCOR) 20 MG tablet; Take 1 tablet (20 mg) by mouth At Bedtime  Dispense: 90 tablet; Refill: 1  - TSH with free T4 reflex      Patient Instructions   1) Labs downstairs today    2) Refilled testosterone    3) Let me know if other questions or concerns come up    Recheck in 6 months    MD Andre Martin,  MD, MD  [facility] [place]

## 2017-11-03 LAB — TESTOST SERPL-MCNC: 1052 NG/DL (ref 240–950)

## 2018-02-07 DIAGNOSIS — E29.1 MALE HYPOGONADISM: ICD-10-CM

## 2018-02-08 RX ORDER — TESTOSTERONE CYPIONATE 200 MG/ML
200 INJECTION, SOLUTION INTRAMUSCULAR
Qty: 2 ML | Refills: 3 | Status: SHIPPED | OUTPATIENT
Start: 2018-02-08 | End: 2018-06-28

## 2018-02-08 NOTE — TELEPHONE ENCOUNTER
Subjective:      Arash Simmons is a 56 y.o. male who presents to the office today for a preoperative consultation at the request of surgeon Cuauhtemoc who plans on performing left TKR on October 25.  Planned anesthesia is general. The patient has the following known anesthesia issues: never had any issues in the past. Patient has a bleeding risk of: no recent abnormal bleeding and no remote history of abnormal bleeding. Patient does not have objections to receiving blood products if needed.    The following portions of the patient's history were reviewed and updated as appropriate: allergies, current medications, past family history, past medical history, past social history, past surgical history and problem list.    Past Medical History:   Diagnosis Date   • Anxiety    • Chest pain    • CHF (congestive heart failure)     Chronic systolic   • Dyspnea on effort    • Fatigue    • Hyperlipidemia    • Hypertension    • Irritability    • Left bundle branch block    • Lower back pain    • Lower leg edema    • Nonischemic cardiomyopathy    • Shortness of breath    • URTI (acute upper respiratory infection)      Past Surgical History:   Procedure Laterality Date   • ADENOIDECTOMY     • CARDIAC DEFIBRILLATOR PLACEMENT      BSC biventricular ICD August 2015   • ELBOW PROCEDURE Right 1973   • PACEMAKER IMPLANTATION  2015   • TONSILLECTOMY       Social History     Social History   • Marital status:      Spouse name: N/A   • Number of children: N/A   • Years of education: N/A     Occupational History   •  [employer]   •  [employer]     Social History Main Topics   • Smoking status: Former Smoker     Packs/day: 2.00     Years: 30.00     Quit date: 7/27/1999   • Smokeless tobacco: Never Used      Comment: states quit in 2000 12/09/2015   • Alcohol use Yes      Comment: 2-4 beers per day   • Drug use: No   • Sexual activity: Defer      Comment:      Other Topics Concern   •  Requested Prescriptions   Pending Prescriptions Disp Refills     testosterone cypionate (DEPOTESTOTERONE) 200 MG/ML injection  Last Written Prescription Date:  11/1/2017  Last Fill Quantity: 3 mL,   # refills: 3  Last Office Visit: 11/1/2017  Future Office visit:       Routing refill request to provider for review/approval because:  Drug not on the [facility], [facility] or [facility] refill protocol or controlled substance      2 mL 3     Sig: Inject 1 mL (200 mg) into the muscle every 14 days    There is no refill protocol information for this order           "None     Social History Narrative     Family History   Problem Relation Age of Onset   • COPD Mother    • Emphysema Mother    • Diabetes Mother    • COPD Father    • Other Father      black lung      Current Outpatient Prescriptions on File Prior to Visit   Medication Sig Dispense Refill   • aspirin 81 MG tablet Take  by mouth daily.     • atorvastatin (LIPITOR) 10 MG tablet Take 1 tablet by mouth Every Night. 90 tablet 1   • carvedilol (COREG) 12.5 MG tablet Take 1 tablet by mouth 2 (Two) Times a Day With Meals. 180 tablet 1   • Cholecalciferol (VITAMIN D3) 2000 UNITS capsule Take 1 capsule by mouth daily.     • FLUoxetine (PROzac) 20 MG tablet Take 1 tablet by mouth Daily. 90 tablet 1   • furosemide (LASIX) 40 MG tablet TAKE ONE TABLET BY MOUTH ONCE DAILY 90 tablet 3   • Probiotic Product (PROBIOTIC DAILY PO) Take  by mouth Daily.     • sacubitril-valsartan (ENTRESTO) 24-26 MG tablet Take 1 tablet by mouth 2 (Two) Times a Day. 180 tablet 3   • spironolactone (ALDACTONE) 25 MG tablet Take 1 tablet by mouth Daily. 90 tablet 1     No current facility-administered medications on file prior to visit.          Review of Systems  A comprehensive review of systems was negative.  knee pain which has been stable       Objective:      Physical Exam  /82  Pulse 78  Temp 98 °F (36.7 °C)  Resp 18  Ht 70\" (177.8 cm)  Wt 221 lb (100 kg)  BMI 31.71 kg/m2  General appearance: alert, appears stated age and cooperative  Head: Normocephalic, without obvious abnormality, atraumatic  Eyes: conjunctivae/corneas clear. PERRL, EOM's intact. Fundi benign.  Ears: normal TM's and external ear canals both ears  Throat: lips, mucosa, and tongue normal; teeth and gums normal  Neck: no adenopathy, supple, symmetrical, trachea midline and thyroid not enlarged, symmetric, no tenderness/mass/nodules  Lungs: clear to auscultation bilaterally  Heart: regular rate and rhythm, S1, S2 normal, no murmur, click, rub or gallop  Abdomen: soft, " non-tender; bowel sounds normal; no masses,  no organomegaly  Skin: Skin color, texture, turgor normal. No rashes or lesions  Antalgic gait with left limp      Cardiographics  ECG: paced, last EKG was before pacing    Imaging  Chest x-ray: ordered     Lab Review   Ordered UA, CMP, CBC, PT/INR        Assessment:      56 y.o. male with planned surgery as above.    Known risk factors for perioperative complications: Congestive heart failure  pacemaker    Difficulty with intubation is not anticipated.    Cardiac Risk Estimation: per the Revised Cardiac Risk Index (Circ. 100:1043, 1999), the patient's risk factors for cardiac complications include history of congestive heart failure, putting him in: RCI RISK CLASS II (1 risk factor, risk of major cardiac compl. appr. 1.3%)    TIFFANY Cardiac Risk Assessment:    1. 65 Years of age or older?        No  2. Risk factors for CAD 3 or greater?       No  3. Known CAD (> or = 50%)?        No  4. ASA used in past 7 days?        Yes (1 Point)  5. Severe Angina (> or = 2 episodes w/in 24 hours)?   No   6. ST changes > or = 0.5 mm?       No  7. Positive Cardiac Markers?        No      Total - 1 Point - 4.7% Risk      Current medications which may produce withdrawal symptoms if withheld perioperatively: ASA 10 days.       Plan:      1. Preoperative workup as follows as above.  2. Change in medication regimen before surgery: discontinue ASA 14 days before surgery.  3. Prophylaxis for cardiac events with perioperative beta-blockers: not indicated.

## 2018-03-07 ENCOUNTER — TELEPHONE (OUTPATIENT)
Dept: PEDIATRICS | Facility: CLINIC | Age: 43
End: 2018-03-07

## 2018-03-07 ENCOUNTER — OFFICE VISIT (OUTPATIENT)
Dept: PEDIATRICS | Facility: CLINIC | Age: 43
End: 2018-03-07
Payer: COMMERCIAL

## 2018-03-07 VITALS
SYSTOLIC BLOOD PRESSURE: 110 MMHG | WEIGHT: 203.06 LBS | BODY MASS INDEX: 32.64 KG/M2 | DIASTOLIC BLOOD PRESSURE: 68 MMHG | HEIGHT: 66 IN | OXYGEN SATURATION: 98 % | TEMPERATURE: 96.8 F | HEART RATE: 77 BPM

## 2018-03-07 DIAGNOSIS — E29.1 MALE HYPOGONADISM: ICD-10-CM

## 2018-03-07 DIAGNOSIS — E11.9 TYPE 2 DIABETES MELLITUS WITHOUT COMPLICATION, WITHOUT LONG-TERM CURRENT USE OF INSULIN (H): Primary | ICD-10-CM

## 2018-03-07 LAB
ERYTHROCYTE [DISTWIDTH] IN BLOOD BY AUTOMATED COUNT: 12.4 % (ref 10–15)
HBA1C MFR BLD: 6.8 % (ref 4.3–6)
HCT VFR BLD AUTO: 48.7 % (ref 40–53)
HGB BLD-MCNC: 16.3 G/DL (ref 13.3–17.7)
MCH RBC QN AUTO: 30.1 PG (ref 26.5–33)
MCHC RBC AUTO-ENTMCNC: 33.5 G/DL (ref 31.5–36.5)
MCV RBC AUTO: 90 FL (ref 78–100)
PLATELET # BLD AUTO: 206 10E9/L (ref 150–450)
RBC # BLD AUTO: 5.41 10E12/L (ref 4.4–5.9)
WBC # BLD AUTO: 8.5 10E9/L (ref 4–11)

## 2018-03-07 PROCEDURE — 36415 COLL VENOUS BLD VENIPUNCTURE: CPT | Performed by: INTERNAL MEDICINE

## 2018-03-07 PROCEDURE — 85027 COMPLETE CBC AUTOMATED: CPT | Performed by: INTERNAL MEDICINE

## 2018-03-07 PROCEDURE — 83036 HEMOGLOBIN GLYCOSYLATED A1C: CPT | Performed by: INTERNAL MEDICINE

## 2018-03-07 PROCEDURE — 99207 C FOOT EXAM  NO CHARGE: CPT | Performed by: INTERNAL MEDICINE

## 2018-03-07 PROCEDURE — 84403 ASSAY OF TOTAL TESTOSTERONE: CPT | Performed by: INTERNAL MEDICINE

## 2018-03-07 PROCEDURE — 82043 UR ALBUMIN QUANTITATIVE: CPT | Performed by: INTERNAL MEDICINE

## 2018-03-07 PROCEDURE — G0103 PSA SCREENING: HCPCS | Performed by: INTERNAL MEDICINE

## 2018-03-07 PROCEDURE — 80053 COMPREHEN METABOLIC PANEL: CPT | Performed by: INTERNAL MEDICINE

## 2018-03-07 PROCEDURE — 99214 OFFICE O/P EST MOD 30 MIN: CPT | Performed by: INTERNAL MEDICINE

## 2018-03-07 PROCEDURE — 80061 LIPID PANEL: CPT | Performed by: INTERNAL MEDICINE

## 2018-03-07 RX ORDER — SIMVASTATIN 20 MG
20 TABLET ORAL AT BEDTIME
Qty: 90 TABLET | Refills: 1 | Status: SHIPPED | OUTPATIENT
Start: 2018-03-07 | End: 2020-01-23

## 2018-03-07 RX ORDER — TESTOSTERONE 25 MG/2.5G
25 GEL TRANSDERMAL DAILY
Qty: 75 G | Refills: 1 | Status: SHIPPED | OUTPATIENT
Start: 2018-03-07 | End: 2019-05-08

## 2018-03-07 RX ORDER — LISINOPRIL 2.5 MG/1
2.5 TABLET ORAL DAILY
Qty: 90 TABLET | Refills: 1 | Status: SHIPPED | OUTPATIENT
Start: 2018-03-07 | End: 2020-01-23

## 2018-03-07 NOTE — TELEPHONE ENCOUNTER
Prior Authorization Retail Medication Request    Medication/Dose: testosterone (ANDROGEL 1%) 25 MG/2.5GM (1%) gel  ICD code (if different than what is on RX): Male hypogonadism [E29.1]   Previously Tried and Failed: N/A  Rationale:  Using without injection     Insurance Name: [employer]   Insurance ID: [id number]      Pharmacy Information (if different than what is on RX)  Name: [facility]   Phone: [phone]

## 2018-03-07 NOTE — LETTER
[facility]  [address]  [place] [address]                  [phone]   March 9, 2018    Darrell GUEVARA Olivares  [address]   [place] [address]      Dear Darrell,    Here is a summary of your recent test results:      Your testosterone was quite low, I would start the androgel as we discussed.     Your test results are enclosed.      Please contact me if you have any questions.           Thank you very much for choosing [facility]    Best regards,    Andre Fsicher MD        Results for orders placed or performed in visit on 03/07/18   Hemoglobin A1c   Result Value Ref Range    Hemoglobin A1C 6.8 (H) 4.3 - 6.0 %   Testosterone total   Result Value Ref Range    Testosterone Total 190 (L) 240 - 950 ng/dL   Lipid panel reflex to direct LDL Non-fasting   Result Value Ref Range    Cholesterol 150 <200 mg/dL    Triglycerides 69 <150 mg/dL    HDL Cholesterol 51 >39 mg/dL    LDL Cholesterol Calculated 85 <100 mg/dL    Non HDL Cholesterol 99 <130 mg/dL   Comprehensive metabolic panel   Result Value Ref Range    Sodium 140 133 - 144 mmol/L    Potassium 4.1 3.4 - 5.3 mmol/L    Chloride 106 94 - 109 mmol/L    Carbon Dioxide 28 20 - 32 mmol/L    Anion Gap 6 3 - 14 mmol/L    Glucose 121 (H) 70 - 99 mg/dL    Urea Nitrogen 13 7 - 30 mg/dL    Creatinine 1.06 0.66 - 1.25 mg/dL    GFR Estimate 76 >60 mL/min/1.7m2    GFR Estimate If Black >90 >60 mL/min/1.7m2    Calcium 8.6 8.5 - 10.1 mg/dL    Bilirubin Total 0.7 0.2 - 1.3 mg/dL    Albumin 4.0 3.4 - 5.0 g/dL    Protein Total 7.1 6.8 - 8.8 g/dL    Alkaline Phosphatase 82 40 - 150 U/L    ALT 57 0 - 70 U/L    AST 22 0 - 45 U/L   Albumin Random Urine Quantitative with Creat Ratio   Result Value Ref Range    Creatinine Urine 243 mg/dL    Albumin Urine mg/L 9 mg/L    Albumin Urine mg/g Cr 3.88 0 - 17 mg/g Cr   CBC with platelets   Result Value Ref Range    WBC 8.5 4.0 - 11.0 10e9/L    RBC Count 5.41 4.4 - 5.9 10e12/L     Hemoglobin 16.3 13.3 - 17.7 g/dL    Hematocrit 48.7 40.0 - 53.0 %    MCV 90 78 - 100 fl    MCH 30.1 26.5 - 33.0 pg    MCHC 33.5 31.5 - 36.5 g/dL    RDW 12.4 10.0 - 15.0 %    Platelet Count 206 150 - 450 10e9/L   PSA, screen   Result Value Ref Range    PSA 0.82 0 - 4 ug/L

## 2018-03-07 NOTE — LETTER
[facility]  [address]  [place] [address]                  [phone]   March 9, 2018    Darrell GUEVARA Olivares  [address]   [place] [address]      Dear Darrell,    Here is a summary of your recent test results:    Your hemoglobin A1c was slightly up from before. This should improve as the weather is nicer and activity can increase. We will recheck this in 6 months.     I am still waiting on the testosterone level but your other labs look great.     Your test results are enclosed.      Please contact me if you have any questions.           Thank you very much for choosing [facility]    Best regards,    Andre Fischer MD        Results for orders placed or performed in visit on 03/07/18   Hemoglobin A1c   Result Value Ref Range    Hemoglobin A1C 6.8 (H) 4.3 - 6.0 %   Lipid panel reflex to direct LDL Non-fasting   Result Value Ref Range    Cholesterol 150 <200 mg/dL    Triglycerides 69 <150 mg/dL    HDL Cholesterol 51 >39 mg/dL    LDL Cholesterol Calculated 85 <100 mg/dL    Non HDL Cholesterol 99 <130 mg/dL   Comprehensive metabolic panel   Result Value Ref Range    Sodium 140 133 - 144 mmol/L    Potassium 4.1 3.4 - 5.3 mmol/L    Chloride 106 94 - 109 mmol/L    Carbon Dioxide 28 20 - 32 mmol/L    Anion Gap 6 3 - 14 mmol/L    Glucose 121 (H) 70 - 99 mg/dL    Urea Nitrogen 13 7 - 30 mg/dL    Creatinine 1.06 0.66 - 1.25 mg/dL    GFR Estimate 76 >60 mL/min/1.7m2    GFR Estimate If Black >90 >60 mL/min/1.7m2    Calcium 8.6 8.5 - 10.1 mg/dL    Bilirubin Total 0.7 0.2 - 1.3 mg/dL    Albumin 4.0 3.4 - 5.0 g/dL    Protein Total 7.1 6.8 - 8.8 g/dL    Alkaline Phosphatase 82 40 - 150 U/L    ALT 57 0 - 70 U/L    AST 22 0 - 45 U/L   Albumin Random Urine Quantitative with Creat Ratio   Result Value Ref Range    Creatinine Urine 243 mg/dL    Albumin Urine mg/L 9 mg/L    Albumin Urine mg/g Cr 3.88 0 - 17 mg/g Cr   CBC with platelets   Result Value Ref Range    WBC  8.5 4.0 - 11.0 10e9/L    RBC Count 5.41 4.4 - 5.9 10e12/L    Hemoglobin 16.3 13.3 - 17.7 g/dL    Hematocrit 48.7 40.0 - 53.0 %    MCV 90 78 - 100 fl    MCH 30.1 26.5 - 33.0 pg    MCHC 33.5 31.5 - 36.5 g/dL    RDW 12.4 10.0 - 15.0 %    Platelet Count 206 150 - 450 10e9/L   PSA, screen   Result Value Ref Range    PSA 0.82 0 - 4 ug/L

## 2018-03-07 NOTE — PATIENT INSTRUCTIONS
1) Can do the androgel on weeks when not doing the injection    2) Labs downstairs today    3) Let me know if new concerns come up.    4) Schedule for an eye check    Andre Fischer MD

## 2018-03-07 NOTE — MR AVS SNAPSHOT
"              After Visit Summary   3/7/2018    Darrell Olivares    MRN: [id number]           Patient Information     Date Of Birth          1975        Visit Information        Provider Department      3/7/2018 1:10 PM Andre [facility]        Today's Diagnoses     Type 2 diabetes mellitus without complication, without long-term current use of insulin (H)    -  1    Male hypogonadism          Care Instructions    1) Can do the androgel on weeks when not doing the injection    2) Labs downstairs today    3) Let me know if new concerns come up.    4) Schedule for an eye check    Andre Fischer MD          Follow-ups after your visit        Who to contact     If you have questions or need follow up information about today's clinic visit or your schedule please contact [facility] directly at [phone].  Normal or non-critical lab and imaging results will be communicated to you by biNuhart, letter or phone within 4 business days after the clinic has received the results. If you do not hear from us within 7 days, please contact the clinic through biNuhart or phone. If you have a critical or abnormal lab result, we will notify you by phone as soon as possible.  Submit refill requests through Wrightspeed or call your pharmacy and they will forward the refill request to us. Please allow 3 business days for your refill to be completed.          Additional Information About Your Visit        MyChart Information     Wrightspeed lets you send messages to your doctor, view your test results, renew your prescriptions, schedule appointments and more. To sign up, go to [email or web address] . Click on \"Log in\" on the left side of the screen, which will take you to the Welcome page. Then click on \"Sign up Now\" on the right side of the page.     You will be asked to enter the access code listed below, as well as some personal information. Please follow the directions to create your username and " "password.     Your access code is: [email or web address]  Expires: 2018  1:29 PM     Your access code will  in 90 days. If you need help or a new code, please call your [facility] clinic or [phone].        Care EveryWhere ID     This is your Care EveryWhere ID. This could be used by other organizations to access your [facility] medical records  [id number]        Your Vitals Were     Pulse Temperature Height Pulse Oximetry BMI (Body Mass Index)       77 96.8  F (36  C) (Tympanic) 5' 6\" (1.676 m) 98% 32.78 kg/m2        Blood Pressure from Last 3 Encounters:   18 110/68   17 118/70   17 114/66    Weight from Last 3 Encounters:   18 203 lb 1 oz (92.1 kg)   17 206 lb (93.4 kg)   17 205 lb (93 kg)              We Performed the Following     Albumin Random Urine Quantitative with Creat Ratio     CBC with platelets     Comprehensive metabolic panel     Hemoglobin A1c     Lipid panel reflex to direct LDL Non-fasting     PSA, screen     Testosterone total          Today's Medication Changes          These changes are accurate as of 3/7/18  1:29 PM.  If you have any questions, ask your nurse or doctor.               Start taking these medicines.        Dose/Directions    testosterone 25 MG/2.5GM (1%) gel   Commonly known as:  ANDROGEL 1%   Used for:  Male hypogonadism   Started by:  Andre Fischer MD        Dose:  25 mg   Place 1 packet (25 mg) onto the skin daily Apply to the clean, dry intact skin of the shoulders, upper arms or abdomen.   Quantity:  75 g   Refills:  1            Where to get your medicines      Some of these will need a paper prescription and others can be bought over the counter.  Ask your nurse if you have questions.     Bring a paper prescription for each of these medications     testosterone 25 MG/2.5GM (1%) gel                Primary Care Provider Office Phone # Fax #    Andre Fischer [phone] [address] [place] " "[place] [phone]        Equal Access to Services     [facility] : Hadii christina ku mio Isbell, waaxda luqadaha, qaybta kaalmada tanya, chauncey ben saradilcia walterbladetim schneider. So [facility] [phone].    ATENCIÓN: Si habla español, tiene a abdi disposición servicios gratuitos de asistencia lingüística. Jose Miguelame al [phone].    We comply with applicable federal civil rights laws and [place] laws. We do not discriminate on the basis of race, color, national origin, age, disability, sex, sexual orientation, or gender identity.            Thank you!     Thank you for choosing [facility] [place]  for your care. Our goal is always to provide you with excellent care. Hearing back from our patients is one way we can continue to improve our services. Please take a few minutes to complete the written survey that you may receive in the mail after your visit with us. Thank you!             Your Updated Medication List - Protect others around you: Learn how to safely use, store and throw away your medicines at www.disposemymeds.org.          This list is accurate as of 3/7/18  1:29 PM.  Always use your most recent med list.                   Brand Name Dispense Instructions for use Diagnosis    ASPIRIN NOT PRESCRIBED    INTENTIONAL    1 each    Please choose reason not prescribed, below    Type 2 diabetes mellitus without complication, without long-term current use of insulin (H)       cyclobenzaprine 10 MG tablet    FLEXERIL    30 tablet    Take 0.5-1 tablets (5-10 mg) by mouth nightly as needed for muscle spasms    Acute pain of left shoulder       ketoconazole 2 % shampoo    NIZORAL    120 mL    Apply to the affected area and wash off after 5 minutes.    Tinea versicolor       lisinopril 2.5 MG tablet    PRINIVIL/Zestril    90 tablet    Take 1 tablet (2.5 mg) by mouth daily    Type 2 diabetes mellitus without complication, without long-term current use of insulin (H)       Needle (Disp) 18G X 1-1/2\" Misc     2 each    2 " "Devices every 14 days    Male hypogonadism, Low testosterone       Needle (Disp) 25G X 1-1/2\" Misc     2 each    2 Devices every 14 days    Low testosterone, Male hypogonadism       simvastatin 20 MG tablet    ZOCOR    90 tablet    Take 1 tablet (20 mg) by mouth At Bedtime    Type 2 diabetes mellitus without complication, without long-term current use of insulin (H)       testosterone 25 MG/2.5GM (1%) gel    ANDROGEL 1%    75 g    Place 1 packet (25 mg) onto the skin daily Apply to the clean, dry intact skin of the shoulders, upper arms or abdomen.    Male hypogonadism       testosterone cypionate 200 MG/ML injection    DEPOTESTOTERONE    2 mL    Inject 1 mL (200 mg) into the muscle every 14 days    Male hypogonadism         "

## 2018-03-07 NOTE — PROGRESS NOTES
"  SUBJECTIVE:   Darrell Olivares is a 42 year old male who presents to clinic today for the following health issues:      Diabetes Follow-up      Patient is checking blood sugars: not at all    Diabetic concerns: None     Symptoms of hypoglycemia (low blood sugar): none     Paresthesias (numbness or burning in feet) or sores: No     Date of last diabetic eye exam: \"hasn't had one\"     BP Readings from Last 2 Encounters:   11/01/17 118/70   09/08/17 114/66     Hemoglobin A1C (%)   Date Value   11/01/2017 6.6 (H)   07/18/2017 6.4 (H)     LDL Cholesterol Direct (mg/dL)   Date Value   03/30/2017 143 (H)       Amount of exercise or physical activity: 4-5 days/week for an average of greater than 60 minutes    Problems taking medications regularly: No    Medication side effects: none    Diet: regular (no restrictions)    Patient notes that his diet is been slightly more poor over the last several months.  He has not been checking sugars due to levels being in the prediabetes range to low positive diabetes range.  He has been exercising more and has been able to tolerate exercise more with the testosterone.  He has not had any paresthesias no chest pain.    Hypogonadism: Patient's been continuing on testosterone injections 100 mg per week.  He notes that he will space these out every other week at times.  And will give more based on how he is feeling.  He has noted improvements in performance he would like to try the gel for a period of time and notes that this would likely not be covered by insurance, however would like to try this as he would like to avoid the injections if he could.  He will stop being on this when his son is visiting him in several months and go back to the injection.        Problem list and histories reviewed & adjusted, as indicated.  Additional history: as documented    Patient Active Problem List   Diagnosis     Low testosterone     Male hypogonadism     Type 2 diabetes mellitus without " "complication, without long-term current use of insulin (H)     No past surgical history on file.    Social History   Substance Use Topics     Smoking status: Never Smoker     Smokeless tobacco: Never Used     Alcohol use Yes     Family History   Problem Relation Age of Onset     DIABETES Father            Reviewed and updated as needed this visit by clinical staff  Med Hx       Reviewed and updated as needed this visit by Provider         ROS:  Constitutional, HEENT, cardiovascular, pulmonary, GI, , musculoskeletal, neuro, skin, endocrine and psych systems are negative, except as otherwise noted.    OBJECTIVE:     /68 (BP Location: Right arm, Patient Position: Chair, Cuff Size: Adult Large)  Pulse 77  Temp 96.8  F (36  C) (Tympanic)  Ht 5' 6\" (1.676 m)  Wt 203 lb 1 oz (92.1 kg)  SpO2 98%  BMI 32.78 kg/m2  Body mass index is 32.78 kg/(m^2).  GENERAL: healthy, alert and no distress  EYES: Eyes grossly normal to inspection, PERRL and conjunctivae and sclerae normal  HENT: ear canals and TM's normal, nose and mouth without ulcers or lesions  NECK: no adenopathy, no asymmetry, masses, or scars and thyroid normal to palpation  RESP: lungs clear to auscultation - no rales, rhonchi or wheezes  CV: regular rate and rhythm, normal S1 S2, no S3 or S4, no murmur, click or rub, no peripheral edema and peripheral pulses strong  ABDOMEN: soft, nontender, no hepatosplenomegaly, no masses and bowel sounds normal  MS: no gross musculoskeletal defects noted, no edema  SKIN: no suspicious lesions or rashes  NEURO: Normal strength and tone, mentation intact and speech normal  PSYCH: mentation appears normal, affect normal/bright  Diabetic foot exam: normal DP and PT pulses, no trophic changes or ulcerative lesions and normal sensory exam    Diagnostic Test Results:  Results for orders placed or performed in visit on 03/07/18   Hemoglobin A1c   Result Value Ref Range    Hemoglobin A1C 6.8 (H) 4.3 - 6.0 %   Lipid panel " reflex to direct LDL Non-fasting   Result Value Ref Range    Cholesterol 150 <200 mg/dL    Triglycerides 69 <150 mg/dL    HDL Cholesterol 51 >39 mg/dL    LDL Cholesterol Calculated 85 <100 mg/dL    Non HDL Cholesterol 99 <130 mg/dL   Comprehensive metabolic panel   Result Value Ref Range    Sodium 140 133 - 144 mmol/L    Potassium 4.1 3.4 - 5.3 mmol/L    Chloride 106 94 - 109 mmol/L    Carbon Dioxide 28 20 - 32 mmol/L    Anion Gap 6 3 - 14 mmol/L    Glucose 121 (H) 70 - 99 mg/dL    Urea Nitrogen 13 7 - 30 mg/dL    Creatinine 1.06 0.66 - 1.25 mg/dL    GFR Estimate 76 >60 mL/min/1.7m2    GFR Estimate If Black >90 >60 mL/min/1.7m2    Calcium 8.6 8.5 - 10.1 mg/dL    Bilirubin Total 0.7 0.2 - 1.3 mg/dL    Albumin 4.0 3.4 - 5.0 g/dL    Protein Total 7.1 6.8 - 8.8 g/dL    Alkaline Phosphatase 82 40 - 150 U/L    ALT 57 0 - 70 U/L    AST 22 0 - 45 U/L   Albumin Random Urine Quantitative with Creat Ratio   Result Value Ref Range    Creatinine Urine 243 mg/dL    Albumin Urine mg/L 9 mg/L    Albumin Urine mg/g Cr 3.88 0 - 17 mg/g Cr   CBC with platelets   Result Value Ref Range    WBC 8.5 4.0 - 11.0 10e9/L    RBC Count 5.41 4.4 - 5.9 10e12/L    Hemoglobin 16.3 13.3 - 17.7 g/dL    Hematocrit 48.7 40.0 - 53.0 %    MCV 90 78 - 100 fl    MCH 30.1 26.5 - 33.0 pg    MCHC 33.5 31.5 - 36.5 g/dL    RDW 12.4 10.0 - 15.0 %    Platelet Count 206 150 - 450 10e9/L       ASSESSMENT/PLAN:       ICD-10-CM    1. Type 2 diabetes mellitus without complication, without long-term current use of insulin (H) E11.9 Hemoglobin A1c     Lipid panel reflex to direct LDL Non-fasting     Comprehensive metabolic panel     Albumin Random Urine Quantitative with Creat Ratio     lisinopril (PRINIVIL/ZESTRIL) 2.5 MG tablet     simvastatin (ZOCOR) 20 MG tablet     FOOT EXAM   2. Male hypogonadism E29.1 Testosterone total     testosterone (ANDROGEL 1%) 25 MG/2.5GM (1%) gel     Comprehensive metabolic panel     CBC with platelets     PSA, screen     We will give  Androgel to use during times where he is not using the injection.  Discussed not using these 2 together.  Will recheck levels today.  Last injection was about a week ago.  Discussed getting in for an eye exam for his diabetes.  Patient Instructions   1) Can do the androgel on weeks when not doing the injection    2) Labs downstairs today    3) Let me know if new concerns come up.    4) Schedule for an eye check    MD Andre Martin MD, MD  [facility]

## 2018-03-08 LAB
ALBUMIN SERPL-MCNC: 4 G/DL (ref 3.4–5)
ALP SERPL-CCNC: 82 U/L (ref 40–150)
ALT SERPL W P-5'-P-CCNC: 57 U/L (ref 0–70)
ANION GAP SERPL CALCULATED.3IONS-SCNC: 6 MMOL/L (ref 3–14)
AST SERPL W P-5'-P-CCNC: 22 U/L (ref 0–45)
BILIRUB SERPL-MCNC: 0.7 MG/DL (ref 0.2–1.3)
BUN SERPL-MCNC: 13 MG/DL (ref 7–30)
CALCIUM SERPL-MCNC: 8.6 MG/DL (ref 8.5–10.1)
CHLORIDE SERPL-SCNC: 106 MMOL/L (ref 94–109)
CHOLEST SERPL-MCNC: 150 MG/DL
CO2 SERPL-SCNC: 28 MMOL/L (ref 20–32)
CREAT SERPL-MCNC: 1.06 MG/DL (ref 0.66–1.25)
CREAT UR-MCNC: 243 MG/DL
GFR SERPL CREATININE-BSD FRML MDRD: 76 ML/MIN/1.7M2
GLUCOSE SERPL-MCNC: 121 MG/DL (ref 70–99)
HDLC SERPL-MCNC: 51 MG/DL
LDLC SERPL CALC-MCNC: 85 MG/DL
MICROALBUMIN UR-MCNC: 9 MG/L
MICROALBUMIN/CREAT UR: 3.88 MG/G CR (ref 0–17)
NONHDLC SERPL-MCNC: 99 MG/DL
POTASSIUM SERPL-SCNC: 4.1 MMOL/L (ref 3.4–5.3)
PROT SERPL-MCNC: 7.1 G/DL (ref 6.8–8.8)
PSA SERPL-ACNC: 0.82 UG/L (ref 0–4)
SODIUM SERPL-SCNC: 140 MMOL/L (ref 133–144)
TRIGL SERPL-MCNC: 69 MG/DL

## 2018-03-09 LAB — TESTOST SERPL-MCNC: 190 NG/DL (ref 240–950)

## 2018-03-14 NOTE — TELEPHONE ENCOUNTER
PA Initiation    Medication: testosterone 1%  Insurance Company: [employer] - Phone [phone] Fax [phone]  Pharmacy Filling the Rx: [id number] - [place], [place] - [address] [place] [address]  Filling Pharmacy Phone: [phone]  Filling Pharmacy Fax:    Start Date: 3/14/2018    Central Prior Authorization Team   Phone: [phone]

## 2018-03-16 NOTE — TELEPHONE ENCOUNTER
Prior Authorization Approval    Authorization Effective Date:  02/12/18  Authorization Expiration Date:  03/13/2023  Medication: testosterone 1% Approved  Approved Dose/Quantity: 75g/30 days  Reference #: [id number]   Insurance Company: [employer] - Phone [phone] Fax [phone]  Expected CoPay: $10     Which Pharmacy is filling the prescription (Not needed for infusion/clinic administered): [id number] - [place], [place] - [address]  Pharmacy Notified: Yes  Patient Notified: Yes

## 2018-06-28 DIAGNOSIS — E29.1 MALE HYPOGONADISM: ICD-10-CM

## 2018-06-28 NOTE — TELEPHONE ENCOUNTER
RX monitoring program ([place]) reviewed:  reviewed- no concerns    [place]:  [email or web address]      Last Filled:  5/28/2018, #2  4/15/2018, #2    Testosterone 25mg  4/16/2018, #75    Mendy SCHNEIDER RN, BSN, PHN  [facility]

## 2018-06-28 NOTE — TELEPHONE ENCOUNTER
Requested Prescriptions   Pending Prescriptions Disp Refills     testosterone cypionate (DEPOTESTOTERONE) 200 MG/ML injection        Last Written Prescription Date:  2/8/2018  Last Fill Quantity: 2 ml,   # refills: 3  Last Office Visit: 3/7/2018  Future Office visit:       Routing refill request to provider for review/approval because:  Drug not on the [facility], U[facility] or [facility] refill protocol or controlled substance     2 mL 3     Sig: Inject 1 mL (200 mg) into the muscle every 14 days    There is no refill protocol information for this order

## 2018-06-29 RX ORDER — TESTOSTERONE CYPIONATE 200 MG/ML
200 INJECTION, SOLUTION INTRAMUSCULAR
Qty: 2 ML | Refills: 3 | Status: SHIPPED | OUTPATIENT
Start: 2018-06-29 | End: 2018-11-11

## 2018-08-05 DIAGNOSIS — E29.1 MALE HYPOGONADISM: ICD-10-CM

## 2018-08-05 DIAGNOSIS — R79.89 LOW TESTOSTERONE: ICD-10-CM

## 2018-08-05 NOTE — TELEPHONE ENCOUNTER
"Needle, Disp, 18G X 1-1/2\" MISC      Last Written Prescription Date:  07/06/2017  Last Fill Quantity: 2 each,   # refills: 11  Last Office Visit: 03/07/2018 Andre Fischer MD  Future Office visit:       Routing refill request to provider for review/approval because:  Drug not on the [facility], [facility] or [facility] refill protocol or controlled substance      Needle, Disp, 25G X 1-1/2\" MISC      Last Written Prescription Date:  07/06/2017  Last Fill Quantity: 2 each,   # refills: 11  Last Office Visit: 03/07/2018 Andre Fischer MD  Future Office visit:       Routing refill request to provider for review/approval because:  Drug not on the [facility], [facility] or [facility] refill protocol or controlled substance    "

## 2018-08-08 RX ORDER — NEEDLES, FILTER 19GX1 1/2"
NEEDLE, DISPOSABLE MISCELLANEOUS
Qty: 2 EACH | Refills: 3 | Status: SHIPPED | OUTPATIENT
Start: 2018-08-08 | End: 2021-11-18

## 2018-08-08 RX ORDER — NEEDLES, DISPOSABLE 25GX5/8"
NEEDLE, DISPOSABLE MISCELLANEOUS
Qty: 2 EACH | Refills: 3 | Status: SHIPPED | OUTPATIENT
Start: 2018-08-08 | End: 2019-02-12

## 2018-09-01 ENCOUNTER — OFFICE VISIT (OUTPATIENT)
Dept: URGENT CARE | Facility: URGENT CARE | Age: 43
End: 2018-09-01
Payer: COMMERCIAL

## 2018-09-01 VITALS
DIASTOLIC BLOOD PRESSURE: 68 MMHG | SYSTOLIC BLOOD PRESSURE: 110 MMHG | WEIGHT: 206 LBS | BODY MASS INDEX: 33.25 KG/M2 | HEART RATE: 104 BPM | OXYGEN SATURATION: 96 % | TEMPERATURE: 96.4 F

## 2018-09-01 DIAGNOSIS — R30.0 DYSURIA: Primary | ICD-10-CM

## 2018-09-01 LAB
ALBUMIN UR-MCNC: NEGATIVE MG/DL
APPEARANCE UR: CLEAR
BILIRUB UR QL STRIP: NEGATIVE
COLOR UR AUTO: YELLOW
GLUCOSE UR STRIP-MCNC: NEGATIVE MG/DL
HGB UR QL STRIP: NEGATIVE
KETONES UR STRIP-MCNC: ABNORMAL MG/DL
LEUKOCYTE ESTERASE UR QL STRIP: NEGATIVE
NITRATE UR QL: NEGATIVE
PH UR STRIP: 6 PH (ref 5–7)
SOURCE: ABNORMAL
SP GR UR STRIP: 1.02 (ref 1–1.03)
UROBILINOGEN UR STRIP-ACNC: 0.2 EU/DL (ref 0.2–1)

## 2018-09-01 PROCEDURE — 99213 OFFICE O/P EST LOW 20 MIN: CPT | Performed by: FAMILY MEDICINE

## 2018-09-01 PROCEDURE — 81003 URINALYSIS AUTO W/O SCOPE: CPT | Performed by: FAMILY MEDICINE

## 2018-09-01 NOTE — PROGRESS NOTES
"SUBJECTIVE:   Darrell Olivares is a 43 year old male who  presents today for a possible UTI. Symptoms of urinary frequency and discomfort with urination (mild burning) have been going on for the past four days.   The urine has been darker yellow today and also malodorous today.   Hematuria no..  There is no history of fever, chills, nausea or vomiting.  No history of penile discharge. This patient does  have a history of urinary tract infections.    Past medical history:  Diabetes Mellitus Type 2    Current Outpatient Prescriptions   Medication Sig Dispense Refill     cyclobenzaprine (FLEXERIL) 10 MG tablet Take 0.5-1 tablets (5-10 mg) by mouth nightly as needed for muscle spasms 30 tablet 1     lisinopril (PRINIVIL/ZESTRIL) 2.5 MG tablet Take 1 tablet (2.5 mg) by mouth daily 90 tablet 1     simvastatin (ZOCOR) 20 MG tablet Take 1 tablet (20 mg) by mouth At Bedtime 90 tablet 1     testosterone cypionate (DEPOTESTOTERONE) 200 MG/ML injection Inject 1 mL (200 mg) into the muscle every 14 days 2 mL 3     ASPIRIN NOT PRESCRIBED (INTENTIONAL) Please choose reason not prescribed, below 1 each 0     BD DISP NEEDLES 18G X 1-1/2\" MISC USE ONE NEEDLE EVERY 14 DAYS TO DRAW UP TESTOSTERONE, SWITCH NEEDLE BEFORE INJECTION 2 each 3     BD INTEGRA SYRINGE 25G X 1\" 3 ML MISC USE ONE SYRINGE TO INJECT TESTOSTERONE EVERY 14 DAYS 2 each 3     ketoconazole (NIZORAL) 2 % shampoo Apply to the affected area and wash off after 5 minutes. (Patient not taking: Reported on 3/7/2018) 120 mL 1     testosterone (ANDROGEL 1%) 25 MG/2.5GM (1%) gel Place 1 packet (25 mg) onto the skin daily Apply to the clean, dry intact skin of the shoulders, upper arms or abdomen. 75 g 1     Social History   Substance Use Topics     Smoking status: Never Smoker     Smokeless tobacco: Never Used     Alcohol use Yes       ROS:   Review of systems negative except as stated above.    OBJECTIVE:  /68  Pulse 104  Temp 96.4  F (35.8  C) (Tympanic)  Wt 206 lb " (93.4 kg)  SpO2 96%  BMI 33.25 kg/m2  GENERAL APPEARANCE: healthy, alert and no distress.  Patient is sitting comfortably.   BACK: No CVA tenderness    UA:    Results for orders placed or performed in visit on 09/01/18   *UA reflex to Microscopic and Culture ([facility] (except [place] and [place])   Result Value Ref Range    Color Urine Yellow     Appearance Urine Clear     Glucose Urine Negative NEG^Negative mg/dL    Bilirubin Urine Negative NEG^Negative    Ketones Urine Trace (A) NEG^Negative mg/dL    Specific Gravity Urine 1.020 1.003 - 1.035    Blood Urine Negative NEG^Negative    pH Urine 6.0 5.0 - 7.0 pH    Protein Albumin Urine Negative NEG^Negative mg/dL    Urobilinogen Urine 0.2 0.2 - 1.0 EU/dL    Nitrite Urine Negative NEG^Negative    Leukocyte Esterase Urine Negative NEG^Negative    Source Midstream Urine          ASSESSMENT:   Dysuria.     PLAN:  Follow up with primary care physician for further evaluation.  Possible need for urology referral, especially to rule out interstitial cystitis.      Saúl Verdugo MD

## 2018-09-01 NOTE — MR AVS SNAPSHOT
"              After Visit Summary   2018    Darrell Olivares    MRN: [id number]           Patient Information     Date Of Birth          1975        Visit Information        Provider Department      2018 1:00 PM Saúl Verdugo MD [facility]        Today's Diagnoses     Dysuria    -  1      Care Instructions    follow up with your primary care provider for further evaluation.                Follow-ups after your visit        Who to contact     If you have questions or need follow up information about today's clinic visit or your schedule please contact [facility] directly at [phone].  Normal or non-critical lab and imaging results will be communicated to you by mydecohart, letter or phone within 4 business days after the clinic has received the results. If you do not hear from us within 7 days, please contact the clinic through mydecohart or phone. If you have a critical or abnormal lab result, we will notify you by phone as soon as possible.  Submit refill requests through [employer] or call your pharmacy and they will forward the refill request to us. Please allow 3 business days for your refill to be completed.          Additional Information About Your Visit        MyChart Information     [employer]ento lets you send messages to your doctor, view your test results, renew your prescriptions, schedule appointments and more. To sign up, go to [email or web address] . Click on \"Log in\" on the left side of the screen, which will take you to the Welcome page. Then click on \"Sign up Now\" on the right side of the page.     You will be asked to enter the access code listed below, as well as some personal information. Please follow the directions to create your username and password.     Your access code is: [email or web address]  Expires: 2018  1:37 PM     Your access code will  in 90 days. If you need help or a new code, please call your [place] clinic or [phone].        Care " EveryWhere ID     This is your Care EveryWhere ID. This could be used by other organizations to access your [facility] medical records  [id number]        Your Vitals Were     Pulse Temperature Pulse Oximetry BMI (Body Mass Index)          104 96.4  F (35.8  C) (Tympanic) 96% 33.25 kg/m2         Blood Pressure from Last 3 Encounters:   09/01/18 110/68   03/07/18 110/68   11/01/17 118/70    Weight from Last 3 Encounters:   09/01/18 206 lb (93.4 kg)   03/07/18 203 lb 1 oz (92.1 kg)   11/01/17 206 lb (93.4 kg)              We Performed the Following     *UA reflex to Microscopic and Culture ([place] [facility] (except [place] and [place])        Primary Care Provider Office Phone # Fax #    Andre Fischer [phone] [address] [place] [address]        Equal Access to Services     [facility]: Hadii aad ku hadasho Sokenyon, waaxda luqadaha, qaybta kaalmada adeegyada, chauncey mccollum . So [facility] [phone].    ATENCIÓN: Si habla español, tiene a abdi disposición servicios gratuitos de asistencia lingüística. Llame al [phone].    We comply with applicable federal civil rights laws and [place] laws. We do not discriminate on the basis of race, color, national origin, age, disability, sex, sexual orientation, or gender identity.            Thank you!     Thank you for choosing [facility] [place] [facility]  for your care. Our goal is always to provide you with excellent care. Hearing back from our patients is one way we can continue to improve our services. Please take a few minutes to complete the written survey that you may receive in the mail after your visit with us. Thank you!             Your Updated Medication List - Protect others around you: Learn how to safely use, store and throw away your medicines at www.disposemymeds.org.          This list is accurate as of 9/1/18  1:37 PM.  Always use your most recent med list.                 "   Brand Name Dispense Instructions for use Diagnosis    ASPIRIN NOT PRESCRIBED    INTENTIONAL    1 each    Please choose reason not prescribed, below    Type 2 diabetes mellitus without complication, without long-term current use of insulin (H)       BD DISP NEEDLES 18G X 1-1/2\" Misc   Generic drug:  Needle (Disp)     2 each    USE ONE NEEDLE EVERY 14 DAYS TO DRAW UP TESTOSTERONE, SWITCH NEEDLE BEFORE INJECTION    Male hypogonadism, Low testosterone       BD INTEGRA SYRINGE 25G X 1\" 3 ML Misc   Generic drug:  syringe/needle (disp)     2 each    USE ONE SYRINGE TO INJECT TESTOSTERONE EVERY 14 DAYS    Low testosterone, Male hypogonadism       cyclobenzaprine 10 MG tablet    FLEXERIL    30 tablet    Take 0.5-1 tablets (5-10 mg) by mouth nightly as needed for muscle spasms    Acute pain of left shoulder       ketoconazole 2 % shampoo    NIZORAL    120 mL    Apply to the affected area and wash off after 5 minutes.    Tinea versicolor       lisinopril 2.5 MG tablet    PRINIVIL/Zestril    90 tablet    Take 1 tablet (2.5 mg) by mouth daily    Type 2 diabetes mellitus without complication, without long-term current use of insulin (H)       simvastatin 20 MG tablet    ZOCOR    90 tablet    Take 1 tablet (20 mg) by mouth At Bedtime    Type 2 diabetes mellitus without complication, without long-term current use of insulin (H)       testosterone 25 MG/2.5GM (1%) gel    ANDROGEL 1%    75 g    Place 1 packet (25 mg) onto the skin daily Apply to the clean, dry intact skin of the shoulders, upper arms or abdomen.    Male hypogonadism       testosterone cypionate 200 MG/ML injection    DEPOTESTOTERONE    2 mL    Inject 1 mL (200 mg) into the muscle every 14 days    Male hypogonadism         "

## 2018-09-06 ENCOUNTER — OFFICE VISIT (OUTPATIENT)
Dept: PEDIATRICS | Facility: CLINIC | Age: 43
End: 2018-09-06
Payer: COMMERCIAL

## 2018-09-06 VITALS
DIASTOLIC BLOOD PRESSURE: 74 MMHG | BODY MASS INDEX: 33.25 KG/M2 | HEART RATE: 80 BPM | SYSTOLIC BLOOD PRESSURE: 92 MMHG | TEMPERATURE: 98.2 F | RESPIRATION RATE: 20 BRPM | WEIGHT: 206 LBS

## 2018-09-06 DIAGNOSIS — E11.9 TYPE 2 DIABETES MELLITUS WITHOUT COMPLICATION, WITHOUT LONG-TERM CURRENT USE OF INSULIN (H): ICD-10-CM

## 2018-09-06 DIAGNOSIS — N41.0 ACUTE BACTERIAL PROSTATITIS: Primary | ICD-10-CM

## 2018-09-06 DIAGNOSIS — E29.1 MALE HYPOGONADISM: ICD-10-CM

## 2018-09-06 LAB
ANION GAP SERPL CALCULATED.3IONS-SCNC: 8 MMOL/L (ref 3–14)
BUN SERPL-MCNC: 13 MG/DL (ref 7–30)
CALCIUM SERPL-MCNC: 8.6 MG/DL (ref 8.5–10.1)
CHLORIDE SERPL-SCNC: 103 MMOL/L (ref 94–109)
CO2 SERPL-SCNC: 27 MMOL/L (ref 20–32)
CREAT SERPL-MCNC: 1 MG/DL (ref 0.66–1.25)
GFR SERPL CREATININE-BSD FRML MDRD: 82 ML/MIN/1.7M2
GLUCOSE SERPL-MCNC: 130 MG/DL (ref 70–99)
HBA1C MFR BLD: 6.2 % (ref 0–5.6)
POTASSIUM SERPL-SCNC: 4.2 MMOL/L (ref 3.4–5.3)
PSA SERPL-ACNC: 0.94 UG/L (ref 0–4)
SODIUM SERPL-SCNC: 138 MMOL/L (ref 133–144)

## 2018-09-06 PROCEDURE — G0103 PSA SCREENING: HCPCS | Performed by: INTERNAL MEDICINE

## 2018-09-06 PROCEDURE — 99214 OFFICE O/P EST MOD 30 MIN: CPT | Performed by: INTERNAL MEDICINE

## 2018-09-06 PROCEDURE — 80048 BASIC METABOLIC PNL TOTAL CA: CPT | Performed by: INTERNAL MEDICINE

## 2018-09-06 PROCEDURE — 84403 ASSAY OF TOTAL TESTOSTERONE: CPT | Performed by: INTERNAL MEDICINE

## 2018-09-06 PROCEDURE — 83036 HEMOGLOBIN GLYCOSYLATED A1C: CPT | Performed by: INTERNAL MEDICINE

## 2018-09-06 PROCEDURE — 36415 COLL VENOUS BLD VENIPUNCTURE: CPT | Performed by: INTERNAL MEDICINE

## 2018-09-06 RX ORDER — SULFAMETHOXAZOLE/TRIMETHOPRIM 800-160 MG
1 TABLET ORAL 2 TIMES DAILY
Qty: 28 TABLET | Refills: 0 | Status: SHIPPED | OUTPATIENT
Start: 2018-09-06 | End: 2018-09-20

## 2018-09-06 NOTE — PROGRESS NOTES
SUBJECTIVE:   Darrell Olivares is a 43 year old male who presents to clinic today for the following health issues:      ED/UC Followup:    Facility:  [facility]  Date of visit: 09/01/2018  Reason for visit: Dysuria  Current Status: Still symptomatic, but a little better. Tingling post urination       Patient has been noting urinary frequency and feels as though cannot completely empty bladder. Occasional deep discomfort. No fevers. Notes that testosterone dose as not changed. He has not had hematuria or overt dysuria. No flank pain. NO abdominal pain. Denies concerns for STIs.    Type 2 diabetes: has been doing well on medications and has been watching diet, staying active. NO paresthesias on feet. Tolerating statin, ACE. No LH or dizziness. NO chest pain or shortness of breath.    Problem list and histories reviewed & adjusted, as indicated.  Additional history: as documented    Patient Active Problem List   Diagnosis     Low testosterone     Male hypogonadism     Type 2 diabetes mellitus without complication, without long-term current use of insulin (H)     History reviewed. No pertinent surgical history.    Social History   Substance Use Topics     Smoking status: Never Smoker     Smokeless tobacco: Never Used     Alcohol use Yes     Family History   Problem Relation Age of Onset     Diabetes Father            Reviewed and updated as needed this visit by clinical staff  Allergies       Reviewed and updated as needed this visit by Provider         ROS:  Constitutional, HEENT, cardiovascular, pulmonary, GI, , musculoskeletal, neuro, skin, endocrine and psych systems are negative, except as otherwise noted.    OBJECTIVE:     BP 92/74  Pulse 80  Temp 98.2  F (36.8  C) (Oral)  Resp 20  Wt 206 lb (93.4 kg)  BMI 33.25 kg/m2  Body mass index is 33.25 kg/(m^2).  GENERAL: healthy, alert and no distress  EYES: Eyes grossly normal to inspection, PERRL and conjunctivae and sclerae normal  NECK: no adenopathy, no  asymmetry, masses, or scars and thyroid normal to palpation  RESP: lungs clear to auscultation - no rales, rhonchi or wheezes  CV: regular rate and rhythm, normal S1 S2, no S3 or S4, no murmur, click or rub, no peripheral edema and peripheral pulses strong  ABDOMEN: soft, nontender, no hepatosplenomegaly, no masses and bowel sounds normal  RECTAL (male): mildly tender prostate, no nodules noted  MS: no gross musculoskeletal defects noted, no edema  SKIN: no suspicious lesions or rashes  NEURO: Normal strength and tone, mentation intact and speech normal    Diagnostic Test Results:  Results for orders placed or performed in visit on 09/06/18   PSA, screen   Result Value Ref Range    PSA 0.94 0 - 4 ug/L   Basic metabolic panel   Result Value Ref Range    Sodium 138 133 - 144 mmol/L    Potassium 4.2 3.4 - 5.3 mmol/L    Chloride 103 94 - 109 mmol/L    Carbon Dioxide 27 20 - 32 mmol/L    Anion Gap 8 3 - 14 mmol/L    Glucose 130 (H) 70 - 99 mg/dL    Urea Nitrogen 13 7 - 30 mg/dL    Creatinine 1.00 0.66 - 1.25 mg/dL    GFR Estimate 82 >60 mL/min/1.7m2    GFR Estimate If Black >90 >60 mL/min/1.7m2    Calcium 8.6 8.5 - 10.1 mg/dL   Hemoglobin A1c   Result Value Ref Range    Hemoglobin A1C 6.2 (H) 0 - 5.6 %       ASSESSMENT/PLAN:       ICD-10-CM    1. Acute bacterial prostatitis N41.0 Testosterone total     PSA, screen     Basic metabolic panel     sulfamethoxazole-trimethoprim (BACTRIM DS/SEPTRA DS) 800-160 MG per tablet     Hemoglobin A1c   2. Type 2 diabetes mellitus without complication, without long-term current use of insulin (H) E11.9 Hemoglobin A1c   3. Male hypogonadism E29.1 Testosterone total     Patient Instructions   1) labs downstairs today    2) we will try the bactrim for treatment for 2 weeks, let me know if no difference after about a week, let me know and we can try to use a medication to relax the prostate.    3) Take a probiotic with the antibiotic as we discussed to prevent diarrhea.    Andre Fischer,  MD Andre Fischer MD, MD  [facility]

## 2018-09-06 NOTE — LETTER
[facility]  [address]  [place] [address]                  [phone]   September 11, 2018    Darrell GUEVARA Olivares  [address]   [place] [address]        Dear Darrell,    Here are the results from the recent Labs that we did.     Your testosterone is in a better level. I would keep it where it is at since you are having the desired effects.    Your diabetes is under good control.    Your prostate testing was normal.     Your kidney functions were normal.     Let me know if you have questions or concerns!    Sincerely,      Andre Fischer MD  Internal Medicine and Pediatrics      Results for orders placed or performed in visit on 09/06/18   Testosterone total   Result Value Ref Range    Testosterone Total 259 240 - 950 ng/dL   PSA, screen   Result Value Ref Range    PSA 0.94 0 - 4 ug/L   Basic metabolic panel   Result Value Ref Range    Sodium 138 133 - 144 mmol/L    Potassium 4.2 3.4 - 5.3 mmol/L    Chloride 103 94 - 109 mmol/L    Carbon Dioxide 27 20 - 32 mmol/L    Anion Gap 8 3 - 14 mmol/L    Glucose 130 (H) 70 - 99 mg/dL    Urea Nitrogen 13 7 - 30 mg/dL    Creatinine 1.00 0.66 - 1.25 mg/dL    GFR Estimate 82 >60 mL/min/1.7m2    GFR Estimate If Black >90 >60 mL/min/1.7m2    Calcium 8.6 8.5 - 10.1 mg/dL   Hemoglobin A1c   Result Value Ref Range    Hemoglobin A1C 6.2 (H) 0 - 5.6 %

## 2018-09-06 NOTE — PATIENT INSTRUCTIONS
1) labs downstairs today    2) we will try the bactrim for treatment for 2 weeks, let me know if no difference after about a week, let me know and we can try to use a medication to relax the prostate.    3) Take a probiotic with the antibiotic as we discussed to prevent diarrhea.    Andre Fischer MD

## 2018-09-06 NOTE — MR AVS SNAPSHOT
"              After Visit Summary   9/6/2018    Darrell Olivares    MRN: [id number]           Patient Information     Date Of Birth          1975        Visit Information        Provider Department      9/6/2018 10:30 AM Andre Fischer MD [facility]        Today's Diagnoses     Acute bacterial prostatitis    -  1    Type 2 diabetes mellitus without complication, without long-term current use of insulin (H)        Male hypogonadism          Care Instructions    1) labs downstairs today    2) we will try the bactrim for treatment for 2 weeks, let me know if no difference after about a week, let me know and we can try to use a medication to relax the prostate.    3) Take a probiotic with the antibiotic as we discussed to prevent diarrhea.    Andre Fischer MD          Follow-ups after your visit        Follow-up notes from your care team     Return in about 2 weeks (around 9/20/2018) for Only if symptoms worsening or not improving.      Who to contact     If you have questions or need follow up information about today's clinic visit or your schedule please contact [facility] directly at [phone].  Normal or non-critical lab and imaging results will be communicated to you by [employer], letter or phone within 4 business days after the clinic has received the results. If you do not hear from us within 7 days, please contact the clinic through [employer] or phone. If you have a critical or abnormal lab result, we will notify you by phone as soon as possible.  Submit refill requests through [employer] or call your pharmacy and they will forward the refill request to us. Please allow 3 business days for your refill to be completed.          Additional Information About Your Visit        MyChart Information     [employer] lets you send messages to your doctor, view your test results, renew your prescriptions, schedule appointments and more. To sign up, go to [email or web address] . Click on \"Log " "in\" on the left side of the screen, which will take you to the Welcome page. Then click on \"Sign up Now\" on the right side of the page.     You will be asked to enter the access code listed below, as well as some personal information. Please follow the directions to create your username and password.     Your access code is: [email or web address]  Expires: 2018  1:37 PM     Your access code will  in 90 days. If you need help or a new code, please call your [facility] or [phone].        Care EveryWhere ID     This is your Care EveryWhere ID. This could be used by other organizations to access your [place] medical records  [id number]        Your Vitals Were     Pulse Temperature Respirations BMI (Body Mass Index)          80 98.2  F (36.8  C) (Oral) 20 33.25 kg/m2         Blood Pressure from Last 3 Encounters:   18 92/74   18 110/68   18 110/68    Weight from Last 3 Encounters:   18 206 lb (93.4 kg)   18 206 lb (93.4 kg)   18 203 lb 1 oz (92.1 kg)              We Performed the Following     Basic metabolic panel     Hemoglobin A1c     PSA, screen     Testosterone total          Today's Medication Changes          These changes are accurate as of 18 10:58 AM.  If you have any questions, ask your nurse or doctor.               Start taking these medicines.        Dose/Directions    sulfamethoxazole-trimethoprim 800-160 MG per tablet   Commonly known as:  BACTRIM DS/SEPTRA DS   Used for:  Acute bacterial prostatitis   Started by:  Andre Fischer MD        Dose:  1 tablet   Take 1 tablet by mouth 2 times daily for 14 days   Quantity:  28 tablet   Refills:  0            Where to get your medicines      These medications were sent to [id number] - [place], [place] - [address]  [address], [facility] [address]     Phone:  [phone]     sulfamethoxazole-trimethoprim 800-160 MG per tablet                Primary Care Provider " "Office Phone # Fax #    Andre Fischer [phone] [address] [place] [address]        Equal Access to Services     JOHNY NICOLASA : Hadii christina fierro abdono Soevaali, waaxda luqadaha, qaybta kaalmada tanya, chauncey favioin hayaadilcia guillenalison lopez ladardilcia schneider. So [facility] [phone].    ATENCIÓN: Si habla español, tiene a abdi disposición servicios gratuitos de asistencia lingüística. Llame al [phone].    We comply with applicable federal civil rights laws and [place] laws. We do not discriminate on the basis of race, color, national origin, age, disability, sex, sexual orientation, or gender identity.            Thank you!     Thank you for choosing [facility]  for your care. Our goal is always to provide you with excellent care. Hearing back from our patients is one way we can continue to improve our services. Please take a few minutes to complete the written survey that you may receive in the mail after your visit with us. Thank you!             Your Updated Medication List - Protect others around you: Learn how to safely use, store and throw away your medicines at www.disposemymeds.org.          This list is accurate as of 9/6/18 10:58 AM.  Always use your most recent med list.                   Brand Name Dispense Instructions for use Diagnosis    ASPIRIN NOT PRESCRIBED    INTENTIONAL    1 each    Please choose reason not prescribed, below    Type 2 diabetes mellitus without complication, without long-term current use of insulin (H)       BD DISP NEEDLES 18G X 1-1/2\" Misc   Generic drug:  Needle (Disp)     2 each    USE ONE NEEDLE EVERY 14 DAYS TO DRAW UP TESTOSTERONE, SWITCH NEEDLE BEFORE INJECTION    Male hypogonadism, Low testosterone       BD INTEGRA SYRINGE 25G X 1\" 3 ML Misc   Generic drug:  syringe/needle (disp)     2 each    USE ONE SYRINGE TO INJECT TESTOSTERONE EVERY 14 DAYS    Low testosterone, Male hypogonadism       cyclobenzaprine 10 MG tablet    " FLEXERIL    30 tablet    Take 0.5-1 tablets (5-10 mg) by mouth nightly as needed for muscle spasms    Acute pain of left shoulder       ketoconazole 2 % shampoo    NIZORAL    120 mL    Apply to the affected area and wash off after 5 minutes.    Tinea versicolor       lisinopril 2.5 MG tablet    PRINIVIL/Zestril    90 tablet    Take 1 tablet (2.5 mg) by mouth daily    Type 2 diabetes mellitus without complication, without long-term current use of insulin (H)       simvastatin 20 MG tablet    ZOCOR    90 tablet    Take 1 tablet (20 mg) by mouth At Bedtime    Type 2 diabetes mellitus without complication, without long-term current use of insulin (H)       sulfamethoxazole-trimethoprim 800-160 MG per tablet    BACTRIM DS/SEPTRA DS    28 tablet    Take 1 tablet by mouth 2 times daily for 14 days    Acute bacterial prostatitis       testosterone 25 MG/2.5GM (1%) gel    ANDROGEL 1%    75 g    Place 1 packet (25 mg) onto the skin daily Apply to the clean, dry intact skin of the shoulders, upper arms or abdomen.    Male hypogonadism       testosterone cypionate 200 MG/ML injection    DEPOTESTOTERONE    2 mL    Inject 1 mL (200 mg) into the muscle every 14 days    Male hypogonadism

## 2018-09-08 LAB — TESTOST SERPL-MCNC: 259 NG/DL (ref 240–950)

## 2018-11-11 DIAGNOSIS — E29.1 MALE HYPOGONADISM: ICD-10-CM

## 2018-11-11 NOTE — TELEPHONE ENCOUNTER
Requested Prescriptions   Pending Prescriptions Disp Refills     testosterone cypionate (DEPOTESTOSTERONE) 200 MG/ML injection  Last Written Prescription Date:  06/29/2018  Last Fill Quantity: 2mL,  # refills: 3   Last office visit: 9/6/2018 with prescribing provider:  LATOYA   Future Office Visit:     2 mL 3     Sig: Inject 1 mL (200 mg) into the muscle every 14 days    Androgen Agents Failed    11/11/2018  4:33 PM       Failed - Refills for this classification require provider review       Passed - Patient is of age 12 and older       Passed - Lipid panel on file in past 12 mos    Recent Labs   Lab Test  03/07/18   1328   CHOL  150   TRIG  69   HDL  51   LDL  85   NHDL  99              Passed - ALT on file within past 12 mos    Recent Labs   Lab Test  03/07/18   1328   ALT  57            Passed - Medication is active on med list       Passed - HCT less than 54% on file within past 12 mos    Recent Labs   Lab Test  03/07/18   1328   HCT  48.7            Passed - Serum Testosterone on file within past 12 mos    Recent Labs   Lab Test  09/06/18   1058   TESTOSTTOTAL  259            Passed - Serum PSA on file within past 12 mos    Lab Results   Component Value Date    PSA 0.94 09/06/2018            Passed - Blood pressure under 140/90 in past 6 months    BP Readings from Last 3 Encounters:   09/06/18 92/74   09/01/18 110/68   03/07/18 110/68                Passed - Patient is not pregnant       Passed - No positive pregnancy test on file within past 12 mos       Passed - Recent (6 mo) or future (30 days) visit within the authorizing provider's specialty       Passed - AST on file within past 12 mos    Recent Labs   Lab Test  03/07/18   1328   AST  22

## 2018-11-14 RX ORDER — TESTOSTERONE CYPIONATE 200 MG/ML
200 INJECTION, SOLUTION INTRAMUSCULAR
Qty: 2 ML | Refills: 3 | Status: SHIPPED | OUTPATIENT
Start: 2018-11-14 | End: 2019-05-07

## 2018-11-14 NOTE — TELEPHONE ENCOUNTER
Routing refill request to provider for review/approval because:  Drug not on the [facility], P or [facility] refill protocol or controlled substance    Kathrin Moulton RN - Triage  [facility]

## 2019-01-07 DIAGNOSIS — R79.89 LOW TESTOSTERONE: Primary | ICD-10-CM

## 2019-01-09 RX ORDER — SYRINGE WITH NEEDLE, 1 ML 25GX5/8"
SYRINGE, EMPTY DISPOSABLE MISCELLANEOUS
Qty: 2 EACH | Refills: 2 | Status: SHIPPED | OUTPATIENT
Start: 2019-01-09 | End: 2019-06-27

## 2019-01-09 NOTE — TELEPHONE ENCOUNTER
Prescription approved per [facility], [facility] or [facility] refill protocol.  Kathrin BOO RN - Triage  [facility]

## 2019-02-12 DIAGNOSIS — R79.89 LOW TESTOSTERONE: ICD-10-CM

## 2019-02-12 DIAGNOSIS — E29.1 MALE HYPOGONADISM: ICD-10-CM

## 2019-02-13 RX ORDER — NEEDLES, DISPOSABLE 25GX5/8"
NEEDLE, DISPOSABLE MISCELLANEOUS
Qty: 2 EACH | Refills: 1 | Status: SHIPPED | OUTPATIENT
Start: 2019-02-13 | End: 2019-05-08

## 2019-02-13 NOTE — TELEPHONE ENCOUNTER
"Requested Prescriptions   Pending Prescriptions Disp Refills     BD DISP NEEDLES 18G X 1-1/2\" MISC        Last Written Prescription Date:  8/8/2018  Last Fill Quantity: 2,   # refills: 3  Last Office Visit: 9/6/2018  Future Office visit:       Routing refill request to provider for review/approval because:  Drug not on the [facility], P or [facility] refill protocol or controlled substance   2 each 3     Sig: USE ONE NEEDLE EVERY 14 DAYS TO DRAW UP TESTOSTERONE, SWITCH NEEDLE BEFORE INJECTION    There is no refill protocol information for this order          "

## 2019-05-07 DIAGNOSIS — E29.1 MALE HYPOGONADISM: ICD-10-CM

## 2019-05-07 NOTE — TELEPHONE ENCOUNTER
Requested Prescriptions   Pending Prescriptions Disp Refills     testosterone cypionate (DEPOTESTOSTERONE) 200 MG/ML injection [Pharmacy Med Name: TESTOSTERONE CYPIONATE 200 SOLN]  Last Written Prescription Date:  11/14/18  Last Fill Quantity: 2 ml,  # refills: 3    Last office visit: 9/6/2018 with prescribing provider:  Andre Fischer MD        Future Office Visit:   Next 5 appointments (look out 90 days)    May 08, 2019  4:00 PM CDT  Office Visit with Karli Sims MD  [facility] [address] [place] [address]  [address]  [facility] [id number] [phone]          2 mL 3     Sig: INJECT 1 ML INTO THE MUSCLE EVERY 14 DAYS       Androgen Agents Failed - 5/7/2019  2:41 PM        Failed - Lipid panel on file in past 12 mos     Recent Labs   Lab Test 03/07/18  1328   CHOL 150   TRIG 69   HDL 51   LDL 85   NHDL 99               Failed - ALT on file within past 12 mos     Recent Labs   Lab Test 03/07/18  1328   ALT 57             Failed - HCT less than 54% on file within past 12 mos     Recent Labs   Lab Test 03/07/18  1328   HCT 48.7             Failed - Refills for this classification require provider review        Failed - Blood pressure under 140/90 in past 6 months     BP Readings from Last 3 Encounters:   09/06/18 92/74   09/01/18 110/68   03/07/18 110/68                 Failed - Recent (6 mo) or future (30 days) visit within the authorizing provider's specialty        Failed - AST on file within past 12 mos     Recent Labs   Lab Test 03/07/18  1328   AST 22             Passed - Patient is of age 12 and older        Passed - Medication is active on med list        Passed - Serum Testosterone on file within past 12 mos     Recent Labs   Lab Test 09/06/18  1058   TESTOSTTOTAL 259             Passed - Serum PSA on file within past 12 mos     Lab Results   Component Value Date    PSA 0.94 09/06/2018             Passed - Patient is not pregnant        Passed - No positive  pregnancy test on file within past 12 mos

## 2019-05-08 ENCOUNTER — OFFICE VISIT (OUTPATIENT)
Dept: PEDIATRICS | Facility: CLINIC | Age: 44
End: 2019-05-08
Payer: COMMERCIAL

## 2019-05-08 VITALS
SYSTOLIC BLOOD PRESSURE: 112 MMHG | HEART RATE: 83 BPM | HEIGHT: 66 IN | BODY MASS INDEX: 33.07 KG/M2 | OXYGEN SATURATION: 98 % | WEIGHT: 205.8 LBS | DIASTOLIC BLOOD PRESSURE: 67 MMHG

## 2019-05-08 DIAGNOSIS — Z11.4 ENCOUNTER FOR SCREENING FOR HIV: ICD-10-CM

## 2019-05-08 DIAGNOSIS — E29.1 MALE HYPOGONADISM: ICD-10-CM

## 2019-05-08 DIAGNOSIS — E11.9 TYPE 2 DIABETES MELLITUS WITHOUT COMPLICATION, WITHOUT LONG-TERM CURRENT USE OF INSULIN (H): ICD-10-CM

## 2019-05-08 DIAGNOSIS — R53.83 FATIGUE, UNSPECIFIED TYPE: ICD-10-CM

## 2019-05-08 DIAGNOSIS — Z79.890 ENCOUNTER FOR MONITORING TESTOSTERONE REPLACEMENT THERAPY: Primary | ICD-10-CM

## 2019-05-08 DIAGNOSIS — R79.89 LOW TESTOSTERONE: ICD-10-CM

## 2019-05-08 DIAGNOSIS — Z51.81 ENCOUNTER FOR MONITORING TESTOSTERONE REPLACEMENT THERAPY: Primary | ICD-10-CM

## 2019-05-08 LAB
ERYTHROCYTE [DISTWIDTH] IN BLOOD BY AUTOMATED COUNT: 15.3 % (ref 10–15)
HBA1C MFR BLD: 6 % (ref 0–5.6)
HCT VFR BLD AUTO: 45.7 % (ref 40–53)
HGB BLD-MCNC: 15.5 G/DL (ref 13.3–17.7)
MCH RBC QN AUTO: 30.4 PG (ref 26.5–33)
MCHC RBC AUTO-ENTMCNC: 33.9 G/DL (ref 31.5–36.5)
MCV RBC AUTO: 90 FL (ref 78–100)
PLATELET # BLD AUTO: 351 10E9/L (ref 150–450)
RBC # BLD AUTO: 5.1 10E12/L (ref 4.4–5.9)
WBC # BLD AUTO: 8.2 10E9/L (ref 4–11)

## 2019-05-08 PROCEDURE — 83036 HEMOGLOBIN GLYCOSYLATED A1C: CPT | Performed by: PEDIATRICS

## 2019-05-08 PROCEDURE — 84443 ASSAY THYROID STIM HORMONE: CPT | Performed by: PEDIATRICS

## 2019-05-08 PROCEDURE — 80053 COMPREHEN METABOLIC PANEL: CPT | Performed by: PEDIATRICS

## 2019-05-08 PROCEDURE — 36415 COLL VENOUS BLD VENIPUNCTURE: CPT | Performed by: PEDIATRICS

## 2019-05-08 PROCEDURE — 87389 HIV-1 AG W/HIV-1&-2 AB AG IA: CPT | Performed by: PEDIATRICS

## 2019-05-08 PROCEDURE — 99214 OFFICE O/P EST MOD 30 MIN: CPT | Performed by: PEDIATRICS

## 2019-05-08 PROCEDURE — 82306 VITAMIN D 25 HYDROXY: CPT | Performed by: PEDIATRICS

## 2019-05-08 PROCEDURE — 84403 ASSAY OF TOTAL TESTOSTERONE: CPT | Performed by: PEDIATRICS

## 2019-05-08 PROCEDURE — 80061 LIPID PANEL: CPT | Performed by: PEDIATRICS

## 2019-05-08 PROCEDURE — 85027 COMPLETE CBC AUTOMATED: CPT | Performed by: PEDIATRICS

## 2019-05-08 PROCEDURE — 99207 C FOOT EXAM  NO CHARGE: CPT | Performed by: PEDIATRICS

## 2019-05-08 PROCEDURE — 82043 UR ALBUMIN QUANTITATIVE: CPT | Performed by: PEDIATRICS

## 2019-05-08 RX ORDER — TESTOSTERONE CYPIONATE 200 MG/ML
INJECTION, SOLUTION INTRAMUSCULAR
Qty: 2 ML | Refills: 3 | Status: SHIPPED | OUTPATIENT
Start: 2019-05-08 | End: 2019-05-13

## 2019-05-08 ASSESSMENT — MIFFLIN-ST. JEOR: SCORE: 1771.25

## 2019-05-08 NOTE — PROGRESS NOTES
SUBJECTIVE:                                                    Darrell Olivares is a 43 year old male who presents to clinic today for the following health issues:      Medication Followup of  Testosterone cypionate     Taking Medication as prescribed: yes- self injection     Side Effects:  None    Medication Helping Symptoms:  NO-still have very weak, no energy      1) Testosterone.   Patient has been on testosterone for several years for hypogonadism.  He [place] 7 [place]  He is currently on IM testosterone 100 mg q. 14 days.  He reports feeling good during the first week of his testosterone and the second week he feels drained and out of energy.  He feels like he needs an increased dose of testosterone.  He also feels weak and tired that second week as well.  He is working out and eating healthy.  He denies any side effects from the testosterone.  He has tried testosterone gel in the past but did not like it.    2) type 2 diabetes.  Patient denies any complications of his type 2 diabetes.  He is currently managed with diet and lifestyle alone.  He is not on any medications.  He does not check his blood sugars.  He denies any polyuria or polydipsia.  He did have a eye appointment last week and he was told he needed update of his prescription.  He did not have any evidence of diabetic retinopathy per his report.  No numbness or tingling or neuropathy symptoms.  No wounds on his feet.     Problem list and histories reviewed & adjusted, as indicated.  Additional history: as documented    Patient Active Problem List   Diagnosis     Low testosterone     Male hypogonadism     Type 2 diabetes mellitus without complication, without long-term current use of insulin (H)     No past surgical history on file.     No surgeries   Social History     Tobacco Use     Smoking status: Never Smoker     Smokeless tobacco: Never Used   Substance Use Topics     Alcohol use:  "Yes     Family History   Problem Relation Age of Onset     Diabetes Father          Current Outpatient Medications   Medication Sig Dispense Refill     ASPIRIN NOT PRESCRIBED (INTENTIONAL) Please choose reason not prescribed, below 1 each 0     BD DISP NEEDLES 18G X 1-1/2\" MISC USE ONE NEEDLE EVERY 14 DAYS TO DRAW UP TESTOSTERONE, SWITCH NEEDLE BEFORE INJECTION 2 each 1     BD INTEGRA SYRINGE 25G X 1\" 3 ML MISC USE ONE SYRINGE TO INJECT TESTOSTERONE EVERY 14 DAYS 2 each 3     BD LUER-SINA SYRINGE 25G X 1-1/2\" 3 ML MISC USE ONE SYRINGE TO INJECT TESTOSTERONE EVERY 14 DAYS 2 each 2     cyclobenzaprine (FLEXERIL) 10 MG tablet Take 0.5-1 tablets (5-10 mg) by mouth nightly as needed for muscle spasms 30 tablet 1     ketoconazole (NIZORAL) 2 % shampoo Apply to the affected area and wash off after 5 minutes. 120 mL 1     lisinopril (PRINIVIL/ZESTRIL) 2.5 MG tablet Take 1 tablet (2.5 mg) by mouth daily 90 tablet 1     simvastatin (ZOCOR) 20 MG tablet Take 1 tablet (20 mg) by mouth At Bedtime 90 tablet 1     testosterone (ANDROGEL 1%) 25 MG/2.5GM (1%) gel Place 1 packet (25 mg) onto the skin daily Apply to the clean, dry intact skin of the shoulders, upper arms or abdomen. 75 g 1     testosterone cypionate (DEPOTESTOSTERONE) 200 MG/ML injection INJECT 1 ML INTO THE MUSCLE EVERY 14 DAYS 2 mL 3     No Known Allergies    ROS:  A 10 point ROS is negative other than the symptoms noted above in the HPI.    OBJECTIVE:     /67   Pulse 83   Ht 1.676 m (5' 6\")   Wt 93.4 kg (205 lb 12.8 oz)   SpO2 98%   BMI 33.22 kg/m    Body mass index is 33.22 kg/m .    Exam:  General: the patient is pleasant, resting comfortably, no acute distress.   HEENT: Normocephalic, atraumatic. MMM.   Neck: supple, full range of motion, no masses  Lymph: no cervical lymphadenopathy  Lungs: Clear to auscultation, no wheezes or crackles.   CV: RRR, nl s1 and s2, no m/r/g.   Abdomen: Soft, nondistended, nontender. No rebound or guarding. Bowel sounds " active.  Extremities: No lower extremity edema. Peripheral pulses strong and symmetric. Foot exam - without lesions, normal sensation  Skin: no appreciable skin lesions/rashes on exposed skin.   Neuro: Alert and oriented x4, grossly normal neurologic exam.     Diagnostic Test Results:  Pending workup  A1c 6%    ASSESSMENT/PLAN:     1. Encounter for monitoring testosterone replacement therapy  2. Male hypogonadism  43-year-old male with a history of hypogonadism presenting for follow-up of his testosterone replacement therapy, currently on 100 mg of IM testosterone q. 14 days.  Overall patient feels that his dose is too low and he is having breakthrough symptoms of fatigue the second week following a dose.  He has never had any side effects from this medication.  His laboratory evaluation in the past has been unremarkable, will repeat today as it has been over a year since we have checked his LFTs CBC and lipids.  As I do not have significant experience in prescribing testosterone I will reach out to his primary care doctor Dr. Andre Fischer for further guidance on adjustment of his testosterone.  I will call the patient once I hear back from him with shared recommendations.    - Lipid panel reflex to direct LDL Fasting    - CBC with platelets    - Comprehensive metabolic panel (BMP + Alb, Alk Phos, ALT, AST, Total. Bili, TP)    - Testosterone, total (collected ~ 2 weeks post his last dose)    - Per discussion with his PCP Dr Fischer will make adjustment once testosterone level returns.    3. Type 2 diabetes mellitus without complication, without long-term current use of insulin (H)  Currently diet managed.  No complications noted.  Asymptomatic and A1c 6% today which is within goal.  Foot exam unremarkable, had ophthalmology exam last week and unremarkable.  Will check microalbumin.    - Albumin Random Urine Quantitative with Creat Ratio    - FOOT EXAM    - Hemoglobin A1c    4. Encounter for screening for HIV  Per [place] PTF  screening guidelines    - HIV Antigen Antibody Combo    5. Fatigue, unspecified type  Although patient thinks his fatigue is related to insufficient testosterone I think it is reasonable to assess for other etiologies.  I have checked a CMP CBC thyroid and vitamin D.  The patient reports is been vitamin D deficient in the past and wanted this checked.  There are no other signs or symptoms on exam to suggest more sinister pathology for his fatigue.    - TSH with free T4 reflex   - Vitamin D Deficiency    Follow up: in 3 months following adjustment in testosterone dosing to assess for response      Karli Sims MD  Internal Medicine Pediatrics  [facility]

## 2019-05-08 NOTE — TELEPHONE ENCOUNTER
Routing refill request to provider for review/approval because:  Drug not on the [facility] refill protocol   Britta Ascnecio RN

## 2019-05-08 NOTE — PATIENT INSTRUCTIONS
Thank you for coming in to clinic today. It was a pleasure to meet you. I am currently building my patient panel and would be happy to see you back in clinic. I currently see patients on Wednesday afternoons and Thursday mornings.     Karli Sims MD  Internal Medicine-Pediatrics   For appointments: [phone]      Doctor's Instructions:   I will call you after discussing your testosterone dose with Dr. Fischer with updates to your regimen.     In the meantime we will look for other causes as to why you are feeling so fatigued.

## 2019-05-08 NOTE — LETTER
[place]   [address]  [place]  [address]  [phone]      May 13, 2019      Darrell GUEVARA Olivares                                                                                                             [address]   [place] [address]              Dear Darrell,    Here are the results from the recent Labs that we did.     Your testosterone returned as low. I would like to ensure taking testosterone to 200 mg every 2 weeks. I sent in a new Prescription for you. Recheck labs in a couple of months after being on this. You can come in for a lab only for this. Levels should be checked 1 week after injection.     Testosterone, total   Order: [id number]   Status:  Final result      Ref Range & Units 5d ago 8mo ago 1yr ago   Testosterone Total 240 - 950 ng/dL 10Low   259 CM 190Low  CM             Let me know if you have questions or concerns!     Sincerely,       Andre Fischer MD   Internal Medicine and Pediatrics

## 2019-05-09 LAB
ALBUMIN SERPL-MCNC: 3.8 G/DL (ref 3.4–5)
ALP SERPL-CCNC: 87 U/L (ref 40–150)
ALT SERPL W P-5'-P-CCNC: 55 U/L (ref 0–70)
ANION GAP SERPL CALCULATED.3IONS-SCNC: 6 MMOL/L (ref 3–14)
AST SERPL W P-5'-P-CCNC: 25 U/L (ref 0–45)
BILIRUB SERPL-MCNC: 0.6 MG/DL (ref 0.2–1.3)
BUN SERPL-MCNC: 14 MG/DL (ref 7–30)
CALCIUM SERPL-MCNC: 8.8 MG/DL (ref 8.5–10.1)
CHLORIDE SERPL-SCNC: 105 MMOL/L (ref 94–109)
CHOLEST SERPL-MCNC: 200 MG/DL
CO2 SERPL-SCNC: 27 MMOL/L (ref 20–32)
CREAT SERPL-MCNC: 1.1 MG/DL (ref 0.66–1.25)
CREAT UR-MCNC: 149 MG/DL
DEPRECATED CALCIDIOL+CALCIFEROL SERPL-MC: 17 UG/L (ref 20–75)
GFR SERPL CREATININE-BSD FRML MDRD: 81 ML/MIN/{1.73_M2}
GLUCOSE SERPL-MCNC: 93 MG/DL (ref 70–99)
HDLC SERPL-MCNC: 10 MG/DL
HIV 1+2 AB+HIV1 P24 AG SERPL QL IA: NONREACTIVE
LDLC SERPL CALC-MCNC: 145 MG/DL
MICROALBUMIN UR-MCNC: 11 MG/L
MICROALBUMIN/CREAT UR: 7.45 MG/G CR (ref 0–17)
NONHDLC SERPL-MCNC: 190 MG/DL
POTASSIUM SERPL-SCNC: 4.4 MMOL/L (ref 3.4–5.3)
PROT SERPL-MCNC: 7 G/DL (ref 6.8–8.8)
SODIUM SERPL-SCNC: 138 MMOL/L (ref 133–144)
TRIGL SERPL-MCNC: 224 MG/DL
TSH SERPL DL<=0.005 MIU/L-ACNC: 0.8 MU/L (ref 0.4–4)

## 2019-05-09 RX ORDER — NEEDLES, DISPOSABLE 25GX5/8"
NEEDLE, DISPOSABLE MISCELLANEOUS
Qty: 2 EACH | Refills: 1 | Status: SHIPPED | OUTPATIENT
Start: 2019-05-09 | End: 2019-06-27

## 2019-05-09 NOTE — TELEPHONE ENCOUNTER
"Requested Prescriptions   Pending Prescriptions Disp Refills     BD DISP NEEDLES 18G X 1-1/2\" MISC  Last Written Prescription Date:  02/13/2019  Last Fill Quantity: 2 each,  # refills: 1    Last Office Visit: No previous visit found Karli Sims MD             05/08/2019  Future Office Visit:      2 each 1     Sig: USE ONE NEEDLE EVERY 14 DAYS TO DRAW UP TESTOSTERONE, SWITCH NEEDLE BEFORE INJECTION       There is no refill protocol information for this order          "

## 2019-05-09 NOTE — TELEPHONE ENCOUNTER
Prescription approved per [facility] Refill Protocol.  Britta Ascencio RN

## 2019-05-10 DIAGNOSIS — E55.9 VITAMIN D DEFICIENCY: Primary | ICD-10-CM

## 2019-05-10 RX ORDER — ERGOCALCIFEROL 1.25 MG/1
50000 CAPSULE, LIQUID FILLED ORAL WEEKLY
Qty: 12 CAPSULE | Refills: 0 | Status: SHIPPED | OUTPATIENT
Start: 2019-05-10 | End: 2020-01-23

## 2019-05-11 LAB — TESTOST SERPL-MCNC: 10 NG/DL (ref 240–950)

## 2019-05-13 DIAGNOSIS — E29.1 MALE HYPOGONADISM: ICD-10-CM

## 2019-05-13 RX ORDER — TESTOSTERONE CYPIONATE 200 MG/ML
1 INJECTION, SOLUTION INTRAMUSCULAR
Qty: 2 ML | Refills: 3 | Status: SHIPPED | OUTPATIENT
Start: 2019-05-13 | End: 2019-12-24

## 2019-06-27 DIAGNOSIS — R79.89 LOW TESTOSTERONE: ICD-10-CM

## 2019-06-27 DIAGNOSIS — E29.1 MALE HYPOGONADISM: ICD-10-CM

## 2019-06-28 RX ORDER — NEEDLES, DISPOSABLE 25GX5/8"
NEEDLE, DISPOSABLE MISCELLANEOUS
Qty: 2 EACH | Refills: 1 | Status: SHIPPED | OUTPATIENT
Start: 2019-06-28 | End: 2019-09-04

## 2019-06-28 RX ORDER — SYRINGE WITH NEEDLE, 1 ML 25GX5/8"
SYRINGE, EMPTY DISPOSABLE MISCELLANEOUS
Qty: 2 EACH | Refills: 2 | Status: SHIPPED | OUTPATIENT
Start: 2019-06-28 | End: 2019-10-18

## 2019-06-28 NOTE — TELEPHONE ENCOUNTER
Prescription approved per [facility] Refill Protocol.  Kristina [place]is RN

## 2019-06-28 NOTE — TELEPHONE ENCOUNTER
"Requested Prescriptions   Pending Prescriptions Disp Refills     BD DISP NEEDLES 18G X 1-1/2\" MISC        Last Written Prescription Date:  5/9/2019  Last Fill Quantity: 2,   # refills: 1  Last Office Visit: 5/8/2019  Future Office visit:       Routing refill request to provider for review/approval because:  Drug not on the [facility], [facility] or [facility] refill protocol or controlled substance   2 each 1     Sig: USE ONE NEEDLE EVERY 14 DAYS TO DRAW UP TESTOSTERONE, SWITCH NEEDLE BEFORE INJECTION       There is no refill protocol information for this order        BD LUER-SINA SYRINGE 25G X 1-1/2\" 3 ML MISC [Pharmacy Med Name: BD LUER-SINA SYRIN 08ZT9-2/2\" 3ML]        Last Written Prescription Date:  1/9/2019  Last Fill Quantity: 2,   # refills: 2  Last Office Visit: 5/8/2019  Future Office visit:       Routing refill request to provider for review/approval because:  Drug not on the [facility], [facility] or [facility] refill protocol or controlled substance   2 each 2     Sig: USE ONE SYRINGE TO INJECT TESTOSTERONE EVERY 14 DAYS       There is no refill protocol information for this order          "

## 2019-09-04 DIAGNOSIS — E29.1 MALE HYPOGONADISM: ICD-10-CM

## 2019-09-04 DIAGNOSIS — R79.89 LOW TESTOSTERONE: ICD-10-CM

## 2019-09-05 NOTE — TELEPHONE ENCOUNTER
"Requested Prescriptions   Pending Prescriptions Disp Refills     BD DISP NEEDLES 18G X 1-1/2\" MISC    Last Written Prescription Date:  6/28/2019  Last Fill Quantity: 2 each,  # refills: 1   Last office visit: 5/8/2019 with prescribing provider:  Andre Fischer Office Visit:     2 each 1     Sig: USE ONE NEEDLE EVERY 14 DAYS TO DRAW UP TESTOSTERONE, SWITCH NEEDLE BEFORE INJECTION       There is no refill protocol information for this order          "

## 2019-09-06 RX ORDER — NEEDLES, DISPOSABLE 25GX5/8"
NEEDLE, DISPOSABLE MISCELLANEOUS
Qty: 2 EACH | Refills: 1 | Status: SHIPPED | OUTPATIENT
Start: 2019-09-06 | End: 2019-10-18

## 2019-09-06 NOTE — TELEPHONE ENCOUNTER
Routing refill request to provider for review/approval because:  Drug not on the [facility] refill protocol   Kathrin Cui RN

## 2019-12-22 DIAGNOSIS — E29.1 MALE HYPOGONADISM: ICD-10-CM

## 2019-12-24 NOTE — TELEPHONE ENCOUNTER
Routing refill request to provider for review/approval because:  Drug not on the [facility] refill protocol     Next 5 appointments (look out 90 days)    Jan 02, 2020 11:00 AM CST  Office Visit with Lico Manuel MD  [facility] [place] ([facility]) [address] [place] [address]  [address]  [place] [address] [phone]        Requested Prescriptions   Pending Prescriptions Disp Refills     testosterone cypionate (DEPOTESTOSTERONE) 200 MG/ML injection 2 mL 3     Sig: Inject 1 mL (200 mg) into the muscle every 14 days       Androgen Agents Failed - 12/24/2019  9:28 AM        Failed - Serum PSA on file within past 12 mos     Lab Results   Component Value Date    PSA 0.94 09/06/2018             Failed - Refills for this classification require provider review        Failed - Blood pressure under 140/90 in past 6 months     BP Readings from Last 3 Encounters:   05/08/19 112/67   09/06/18 92/74   09/01/18 110/68                 Passed - Patient is of age 12 and older        Passed - Lipid panel on file in past 12 mos     Recent Labs   Lab Test 05/08/19  1637   CHOL 200*   TRIG 224*   HDL 10*   *   NHDL 190*               Passed - ALT on file within past 12 mos     Recent Labs   Lab Test 05/08/19  1637   ALT 55             Passed - Medication is active on med list        Passed - HCT less than 54% on file within past 12 mos     Recent Labs   Lab Test 05/08/19  1637   HCT 45.7             Passed - Serum Testosterone on file within past 12 mos     Recent Labs   Lab Test 05/08/19  1637   TESTOSTTOTAL 10*             Passed - Patient is not pregnant        Passed - No positive pregnancy test on file within past 12 mos        Passed - Recent (6 mo) or future (30 days) visit within the authorizing provider's specialty        Passed - AST on file within past 12 mos     Recent Labs   Lab Test 05/08/19  1637   AST 25

## 2019-12-26 RX ORDER — TESTOSTERONE CYPIONATE 200 MG/ML
1 INJECTION, SOLUTION INTRAMUSCULAR
Qty: 2 ML | Refills: 0 | Status: SHIPPED | OUTPATIENT
Start: 2019-12-26 | End: 2020-01-23

## 2019-12-27 NOTE — TELEPHONE ENCOUNTER
Macie refill - has appt upcoming.  Can get further refills at that time.  Please inform pt.    Edy Mcfadden MD

## 2020-01-23 ENCOUNTER — OFFICE VISIT (OUTPATIENT)
Dept: PEDIATRICS | Facility: CLINIC | Age: 45
End: 2020-01-23
Payer: COMMERCIAL

## 2020-01-23 VITALS
DIASTOLIC BLOOD PRESSURE: 68 MMHG | WEIGHT: 201.2 LBS | HEIGHT: 66 IN | TEMPERATURE: 97.9 F | BODY MASS INDEX: 32.33 KG/M2 | OXYGEN SATURATION: 96 % | SYSTOLIC BLOOD PRESSURE: 100 MMHG | HEART RATE: 79 BPM

## 2020-01-23 DIAGNOSIS — E11.9 TYPE 2 DIABETES MELLITUS WITHOUT COMPLICATION, WITHOUT LONG-TERM CURRENT USE OF INSULIN (H): Primary | ICD-10-CM

## 2020-01-23 DIAGNOSIS — E29.1 MALE HYPOGONADISM: ICD-10-CM

## 2020-01-23 DIAGNOSIS — E55.9 VITAMIN D DEFICIENCY: ICD-10-CM

## 2020-01-23 DIAGNOSIS — R79.89 LOW TESTOSTERONE: ICD-10-CM

## 2020-01-23 LAB — HBA1C MFR BLD: 6.8 % (ref 0–5.6)

## 2020-01-23 PROCEDURE — G0103 PSA SCREENING: HCPCS | Performed by: PEDIATRICS

## 2020-01-23 PROCEDURE — 36415 COLL VENOUS BLD VENIPUNCTURE: CPT | Performed by: PEDIATRICS

## 2020-01-23 PROCEDURE — 99214 OFFICE O/P EST MOD 30 MIN: CPT | Mod: GC | Performed by: STUDENT IN AN ORGANIZED HEALTH CARE EDUCATION/TRAINING PROGRAM

## 2020-01-23 PROCEDURE — 84403 ASSAY OF TOTAL TESTOSTERONE: CPT | Performed by: PEDIATRICS

## 2020-01-23 PROCEDURE — 82306 VITAMIN D 25 HYDROXY: CPT | Performed by: PEDIATRICS

## 2020-01-23 PROCEDURE — 83036 HEMOGLOBIN GLYCOSYLATED A1C: CPT | Performed by: PEDIATRICS

## 2020-01-23 RX ORDER — TESTOSTERONE CYPIONATE 200 MG/ML
1 INJECTION, SOLUTION INTRAMUSCULAR
Qty: 2 ML | Refills: 3 | Status: SHIPPED | OUTPATIENT
Start: 2020-01-23 | End: 2020-07-25

## 2020-01-23 RX ORDER — ERGOCALCIFEROL 1.25 MG/1
50000 CAPSULE, LIQUID FILLED ORAL WEEKLY
Qty: 12 CAPSULE | Refills: 0 | Status: CANCELLED | OUTPATIENT
Start: 2020-01-23

## 2020-01-23 RX ORDER — TESTOSTERONE CYPIONATE 200 MG/ML
1 INJECTION, SOLUTION INTRAMUSCULAR
Qty: 2 ML | Refills: 0 | Status: CANCELLED | OUTPATIENT
Start: 2020-01-23

## 2020-01-23 ASSESSMENT — PATIENT HEALTH QUESTIONNAIRE - PHQ9
10. IF YOU CHECKED OFF ANY PROBLEMS, HOW DIFFICULT HAVE THESE PROBLEMS MADE IT FOR YOU TO DO YOUR WORK, TAKE CARE OF THINGS AT HOME, OR GET ALONG WITH OTHER PEOPLE: NOT DIFFICULT AT ALL
SUM OF ALL RESPONSES TO PHQ QUESTIONS 1-9: 3
SUM OF ALL RESPONSES TO PHQ QUESTIONS 1-9: 3

## 2020-01-23 ASSESSMENT — ENCOUNTER SYMPTOMS
COUGH: 1
NERVOUS/ANXIOUS: 1

## 2020-01-23 ASSESSMENT — MIFFLIN-ST. JEOR: SCORE: 1739.52

## 2020-01-23 NOTE — PROGRESS NOTES
Subjective      Darrell Olivares is a 44 year old male who presents to clinic today for the following health issues:    Healthy Habits:     Getting at least 3 servings of Calcium per day:  Yes    Bi-annual eye exam:  NO    Dental care twice a year:  Yes    Sleep apnea or symptoms of sleep apnea:  None    Diet:  Carbohydrate counting    Frequency of exercise:  6-7 days/week    Duration of exercise:  Greater than 60 minutes    Taking medications regularly:  Yes    Medication side effects:  None    PHQ-2 Total Score: 3    Additional concerns today:  No     Establish Care   RESPIRATORY SYMPTOMS      Duration: over 1 month     Description  cough    Severity: mild    Accompanying signs and symptoms: dry cough     History (predisposing factors):  none    Precipitating or alleviating factors: notice more exercising specifically lifting heavy weight and running     Therapies tried and outcome:  none    Mr. Olivares is a 44-year-old male with history of vitamin D deficiency and testosterone deficiency who presents to clinic for establishment of care.  He has 2 concerns today.  First, he has had cough for about a month.  He describes as dry cough.  Mainly during working out and resolves afterward.  Not associated with shortness of breath.  Denies rhinorrhea or nasal congestion; however, does have some postnasal drainage.  No heartburn or reflux symptoms.  He states that did not have any viral illness at the onset of cough.  No cold symptoms right now.  No pets at home.  No smoking or vaping.  No alcohol.  Has not started on any new medication.    Second, he has had history of vitamin D deficiency and was treated with high-dose vitamin D last year.  Since then he has not been on any maintenance supplements.  When his vitamin D level was low, he was having some mood changes as well as low energy level.  Currently, he feels like this and would like to get it checked out again.    He is due for his testosterone level check  "and needs refills and testosterone injection.  Otherwise he is healthy and denies any other symptoms.    Patient Active Problem List   Diagnosis     Low testosterone     Male hypogonadism     Type 2 diabetes mellitus without complication, without long-term current use of insulin (H)     History reviewed. No pertinent surgical history.    Social History     Tobacco Use     Smoking status: Never Smoker     Smokeless tobacco: Never Used   Substance Use Topics     Alcohol use: Yes     Family History   Problem Relation Age of Onset     Diabetes Father      Lung Cancer Father          Current Outpatient Medications   Medication Sig Dispense Refill     ASPIRIN NOT PRESCRIBED (INTENTIONAL) Please choose reason not prescribed, below 1 each 0     BD DISP NEEDLES 18G X 1-1/2\" MISC USE ONE NEEDLE EVERY 14 DAYS TO DRAW UP TESTOSTERONE, SWITCH NEEDLE BEFORE INJECTION 2 each 0     BD INTEGRA SYRINGE 25G X 1\" 3 ML MISC USE ONE SYRINGE TO INJECT TESTOSTERONE EVERY 14 DAYS 2 each 3     BD LUER-SINA SYRINGE 25G X 1-1/2\" 3 ML MISC USE ONE SYRINGE TO INJECT TESTOSTERONE EVERY 14 DAYS 2 each 0     ketoconazole (NIZORAL) 2 % shampoo Apply to the affected area and wash off after 5 minutes. 120 mL 1     lisinopril (PRINIVIL/ZESTRIL) 2.5 MG tablet Take 1 tablet (2.5 mg) by mouth daily 90 tablet 1     simvastatin (ZOCOR) 20 MG tablet Take 1 tablet (20 mg) by mouth At Bedtime 90 tablet 1     testosterone cypionate (DEPOTESTOSTERONE) 200 MG/ML injection Inject 1 mL (200 mg) into the muscle every 14 days 2 mL 0     No Known Allergies    Reviewed and updated as needed this visit by Provider         Review of Systems   ROS COMP: CONSTITUTIONAL: NEGATIVE for fever, chills, change in weight  INTEGUMENTARY/SKIN: NEGATIVE for worrisome rashes  EYES: NEGATIVE for vision changes  ENT/MOUTH: Positive for postnasal drainage  RESP: NEGATIVE for significant cough or SOB  CV: NEGATIVE for chest pain, palpitations or peripheral edema  GI: NEGATIVE for nausea, " "abdominal pain, heartburn, or change in bowel habits  : NEGATIVE for frequency, dysuria, or hematuria  MUSCULOSKELETAL: NEGATIVE for significant arthralgias or myalgia  NEURO: NEGATIVE for weakness  ENDOCRINE: NEGATIVE for temperature intolerance, skin/hair changes  HEME: NEGATIVE for bleeding problems  PSYCHIATRIC: NEGATIVE for changes in mood or affect      Objective    /68 (BP Location: Right arm, Patient Position: Chair, Cuff Size: Adult Large)   Pulse 79   Temp 97.9  F (36.6  C) (Oral)   Ht 1.667 m (5' 5.63\")   Wt 91.3 kg (201 lb 3.2 oz)   SpO2 96%   BMI 32.84 kg/m    Body mass index is 32.84 kg/m .     Physical Exam   GENERAL: healthy, alert and no distress  EYES: Normal conjunctiva, pupils equal/round/reactive to light, extraocular movements intact  HENT: Bilateral tympanic membranes appear normal, no nasal discharge, moist mucous membrane, nonerythematous oropharynx, no oral lesions  NECK: No lymphadenopathy  RESP: lungs clear to auscultation - no rales, rhonchi or wheezes  CV: regular rate and rhythm, normal S1 S2, no S3 or S4, no murmur, click or rub, no peripheral edema and peripheral pulses strong  ABDOMEN: soft, nontender, no hepatosplenomegaly, no masses and bowel sounds normal  MS: no gross musculoskeletal defects noted, no edema  SKIN: no suspicious lesions or rashes  NEURO: Alert, cranial nerve grossly intact, 5 out of 5 strength in all extremities bilaterally, normal gait, mentation intact and speech normal  PSYCH: mentation appears normal, affect normal/bright    Diagnostic Test Results:  - PSA: pending  - HgbA1C: pending  - testosterone level: pending  - vitamin D: pending        Assessment & Plan     1. Type 2 diabetes mellitus without complication, without long-term current use of insulin (H)  In the past his hemoglobin A1c was high 6.8% most recently at 6% last year.  No new symptoms today.  Would like to get this rechecked.  He has been working hard on diet changes as well as " "increasing exercise frequency  - Hemoglobin A1c    2. Vitamin D deficiency  His vitamin D level last year was 17 and was treated with high-dose vitamin D supplements.  Has not been on any supplements since then.  Per patient, he feels like the same symptoms he experienced last year is back and would like to get it checked.  Discussed with patient that if vitamin D level is low then would likely need to restart on the same therapy as last year.  Also would be helpful to be on some kind of low-dose maintenance therapy especially during the winter season when he is not getting enough sun exposure  - Vitamin D Deficiency    3. Low testosterone  Is taking testosterone injection every 2 weeks.  Today he is in between the injection days.  We will check his testosterone level today as well as PSA which was not done last year and needs to be screened.  He will follow-up in about 3 to 4 months in early May for testosterone deficiency follow-up as well as annual physical.  At that time we will check his hemoglobin and liver function along with other annual physical labs  - Testosterone, total  - testosterone cypionate (DEPOTESTOSTERONE) 200 MG/ML injection; Inject 1 mL (200 mg) into the muscle every 14 days  Dispense: 2 mL; Refill: 3  - PSA, screen    4. Male hypogonadism  See above  - Testosterone, total  - testosterone cypionate (DEPOTESTOSTERONE) 200 MG/ML injection; Inject 1 mL (200 mg) into the muscle every 14 days  Dispense: 2 mL; Refill: 3  - PSA, screen     BMI:   Estimated body mass index is 32.84 kg/m  as calculated from the following:    Height as of this encounter: 1.667 m (5' 5.63\").    Weight as of this encounter: 91.3 kg (201 lb 3.2 oz).   Weight management plan: Discussed healthy diet and exercise guidelines        FUTURE APPOINTMENTS:  Return in about 3 months (around 4/23/2020) for Follow-up, Physical Exam.    Patient seen and discussed with Dr. Ashia Manuel MD  [facility] " [place]      Answers for HPI/ROS submitted by the patient on 1/23/2020   Annual Exam:  If you checked off any problems, how difficult have these problems made it for you to do your work, take care of things at home, or get along with other people?: Not difficult at all  PHQ9 TOTAL SCORE: 3    Attestation:    I have seen patient and reviewed the documentation from Dr. Manuel and discussed the findings with Dr. Manuel. I agree with the documentation of Dr. Manuel.    Rosita Ang MD  Internal Medicine/Pediatrics  [facility]

## 2020-01-23 NOTE — PATIENT INSTRUCTIONS
- please get flonase from any pharmacy; 1 spray each nostril daily; give full 4 weeks for trial to see full effect  - will refill testosterone injection  - will check your testosterone level, PSA level, HgbA1C, and vitamin D level  - if vitamin D level is low, will restart the treatment with supplement

## 2020-01-23 NOTE — LETTER
[facility]  [address]  [place] [address]                  [phone]   January 27, 2020    Darrell Olivares  [address]   [place] [address]      Dear Darrell,    Here is a summary of your recent test results:    Mr. Olivares, this is Dr. Manuel from [facility]. Your vitamin D level was in normal range although on the lower side. I would recommend taking over the counter vitamin D supplements [id number] U daily. Your HgbA1C was higher than before at 6.8%. This is in the range of diabetes. I would recommend cutting down on carbohydrates from your diet and continue to do exercise. If it is high persistently, we might need to talk about starting a medication to help with your diabetes. Your PSA level was normal. Your testosterone level was slightly lower than the normal range. I know you were talking about low energy, since vitamin D level is normal, this could be due to lower testosterone level. It certainly could be lower if we checked the level not exactly mid-injections. If the next check is also low or you continue to notice low energy, please call clinic to see if other formulary of testosterone might be more effective.     Your test results are enclosed.      Please contact me if you have any questions.           Thank you very much for choosing [facility]    Best regards,    Lico Manuel MD (Resident)        Results for orders placed or performed in visit on 01/23/20   Hemoglobin A1c     Status: Abnormal   Result Value Ref Range    Hemoglobin A1C 6.8 (H) 0 - 5.6 %   Vitamin D Deficiency     Status: None   Result Value Ref Range    Vitamin D Deficiency screening 27 20 - 75 ug/L   Testosterone, total     Status: Abnormal   Result Value Ref Range    Testosterone Total 160 (L) 240 - 950 ng/dL   PSA, screen     Status: None   Result Value Ref Range    PSA 0.67 0 - 4 ug/L

## 2020-01-24 LAB
DEPRECATED CALCIDIOL+CALCIFEROL SERPL-MC: 27 UG/L (ref 20–75)
PSA SERPL-ACNC: 0.67 UG/L (ref 0–4)

## 2020-01-24 ASSESSMENT — PATIENT HEALTH QUESTIONNAIRE - PHQ9: SUM OF ALL RESPONSES TO PHQ QUESTIONS 1-9: 3

## 2020-01-25 LAB — TESTOST SERPL-MCNC: 160 NG/DL (ref 240–950)

## 2020-02-16 DIAGNOSIS — E29.1 MALE HYPOGONADISM: ICD-10-CM

## 2020-02-16 DIAGNOSIS — R79.89 LOW TESTOSTERONE: ICD-10-CM

## 2020-02-19 RX ORDER — NEEDLES, DISPOSABLE 25GX5/8"
NEEDLE, DISPOSABLE MISCELLANEOUS
Qty: 2 EACH | Refills: 0 | Status: SHIPPED | OUTPATIENT
Start: 2020-02-19 | End: 2020-04-03

## 2020-02-19 RX ORDER — SYRINGE WITH NEEDLE, 1 ML 25GX5/8"
SYRINGE, EMPTY DISPOSABLE MISCELLANEOUS
Qty: 2 EACH | Refills: 0 | Status: SHIPPED | OUTPATIENT
Start: 2020-02-19 | End: 2020-04-03

## 2020-02-19 NOTE — TELEPHONE ENCOUNTER
"  BD LUER-SIAN SYRINGE 25G X 1-1/2\" 3 ML MISC        Last written prescription date: 10/21/19        Last fill quantity: 2 each, # refills: 0        Last office visit: 1/23/20        Future office visit: N/A        Is this a controlled substance?  No       Routing refill request to provider for review/approval because: Drug not on the [facility], [facility] or [facility] refill protocol or controlled substance    ______________________________________________      BD DISP NEEDLES 18G X 1-1/2\" MISC        Last written prescription date: 10/21/19        Last fill quantity: 2 Each, # refills:  0        Last office visit: 1/23/20        Future office visit: N/A        Is this a controlled substance?  No       Routing refill request to provider for review/approval because: Drug not on the [facility], [facility] or  [facility] refill protocol or controlled substance    Senia FONTENOT RN, BSN        "

## 2020-05-02 ENCOUNTER — TELEPHONE (OUTPATIENT)
Dept: PEDIATRICS | Facility: CLINIC | Age: 45
End: 2020-05-02

## 2020-05-02 NOTE — TELEPHONE ENCOUNTER
Panel Management Review      Patient has the following on his problem list:     Diabetes      Summary:    Patient is due/failing the following:   Diabetic eye exam.    Action needed:   Needs above.     Type of outreach:    Sent letter.     Questions for provider review:    None                                                                                                                                    NEAL GUNN MA on 5/2/2020 at 6:51 PM

## 2020-05-02 NOTE — LETTER
May 2, 2020      Darrell Olivares  [address]   [place] [address]        Dear Darrell,       We care about your health and have reviewed your health plan including your medical conditions, medications, and lab results.  Based on this review, it is recommended that you follow up regarding the following health topic(s):  -Diabetes    We recommend you take the following action(s):  -schedule a diabetic eye exam or if you have had an eye exam in the last 12 months please have those records forwarded to our office.     Please call us at the [facility] - [phone] (or use [email or web address]) to address the above recommendations.     Thank you for trusting [facility] and we appreciate the opportunity to serve you.  We look forward to supporting your healthcare needs in the future.    Healthy Regards,    Your Health Care Team  [facility]

## 2020-05-23 DIAGNOSIS — R79.89 LOW TESTOSTERONE: ICD-10-CM

## 2020-05-23 DIAGNOSIS — E29.1 MALE HYPOGONADISM: ICD-10-CM

## 2020-05-26 NOTE — TELEPHONE ENCOUNTER
Routing refill request to provider for review/approval because:  Drug not on the [facility] refill protocol     Zakia Nava RN on 5/26/2020 at 11:25 AM

## 2020-05-27 RX ORDER — SYRINGE WITH NEEDLE, 1 ML 25GX5/8"
SYRINGE, EMPTY DISPOSABLE MISCELLANEOUS
Qty: 2 EACH | Refills: 0 | Status: SHIPPED | OUTPATIENT
Start: 2020-05-27 | End: 2020-07-23

## 2020-05-27 RX ORDER — NEEDLES, DISPOSABLE 25GX5/8"
NEEDLE, DISPOSABLE MISCELLANEOUS
Qty: 2 EACH | Refills: 0 | Status: SHIPPED | OUTPATIENT
Start: 2020-05-27 | End: 2020-07-23

## 2020-09-02 ENCOUNTER — OFFICE VISIT (OUTPATIENT)
Dept: PEDIATRICS | Facility: CLINIC | Age: 45
End: 2020-09-02
Payer: COMMERCIAL

## 2020-09-02 VITALS
OXYGEN SATURATION: 98 % | HEART RATE: 93 BPM | HEIGHT: 65 IN | WEIGHT: 203.9 LBS | SYSTOLIC BLOOD PRESSURE: 110 MMHG | BODY MASS INDEX: 33.97 KG/M2 | TEMPERATURE: 97.5 F | DIASTOLIC BLOOD PRESSURE: 78 MMHG | RESPIRATION RATE: 18 BRPM

## 2020-09-02 DIAGNOSIS — Z00.00 ROUTINE GENERAL MEDICAL EXAMINATION AT A HEALTH CARE FACILITY: Primary | ICD-10-CM

## 2020-09-02 DIAGNOSIS — E11.9 TYPE 2 DIABETES MELLITUS WITHOUT COMPLICATION, WITHOUT LONG-TERM CURRENT USE OF INSULIN (H): ICD-10-CM

## 2020-09-02 DIAGNOSIS — R79.89 LOW TESTOSTERONE: ICD-10-CM

## 2020-09-02 PROCEDURE — 90471 IMMUNIZATION ADMIN: CPT | Performed by: STUDENT IN AN ORGANIZED HEALTH CARE EDUCATION/TRAINING PROGRAM

## 2020-09-02 PROCEDURE — 90686 IIV4 VACC NO PRSV 0.5 ML IM: CPT | Performed by: STUDENT IN AN ORGANIZED HEALTH CARE EDUCATION/TRAINING PROGRAM

## 2020-09-02 PROCEDURE — 99396 PREV VISIT EST AGE 40-64: CPT | Mod: GC | Performed by: STUDENT IN AN ORGANIZED HEALTH CARE EDUCATION/TRAINING PROGRAM

## 2020-09-02 ASSESSMENT — MIFFLIN-ST. JEOR: SCORE: 1736.76

## 2020-09-02 NOTE — PROGRESS NOTES
SUBJECTIVE:   CC: Darrell Olivares is an 45 year old male who presents for preventative health visit.     Healthy Habits:    Getting at least 3 servings of Calcium per day:  Yes    Bi-annual eye exam:  NO    Dental care twice a year:  Yes    Sleep apnea or symptoms of sleep apnea:  None    Diet:  Other    Frequency of exercise:  6-7 days/week    Duration of exercise:  Greater than 60 minutes    Taking medications regularly:  Yes    Barriers to taking medications:  None    Medication side effects:  None    PHQ-2 Total Score:    Overall doing fairly well.  He notes that he has been feeling tired recently.  However states that for the past month he has been following a very restricted diet and only eating 600 calories per day. With this he has succeeded in losing 12 lbs, however has also felt very tired and overall poorly.  He notes that overt he past 3 days he liberalized his diet an has been eating 2500 calories daily and his energy is significantly improved and he feels great.      He also notes that on the 3rd week of the month he feels like his testosterone is getting low.  He will notice that the lower half of his body is sore and his feet ache and he feels more tired.  He notes that this only happens at the prison point after his second shot of the month, not after the first shot.      Today's PHQ-2 Score:   PHQ-2 ( 1999 [employer]) 1/23/2020   Q1: Little interest or pleasure in doing things 3   Q2: Feeling down, depressed or hopeless 0   PHQ-2 Score 3   Q1: Little interest or pleasure in doing things Nearly every day   Q2: Feeling down, depressed or hopeless Not at all   PHQ-2 Score 3       Abuse: Current or Past(Physical, Sexual or Emotional)- No  Do you feel safe in your environment? Yes    Social History     Tobacco Use     Smoking status: Never Smoker     Smokeless tobacco: Never Used   Substance Use Topics     Alcohol use: Yes     Has not been drinking alcohol for the past 3 months     Alcohol Use  "1/23/2020   Prescreen: >3 drinks/day or >7 drinks/week? No       Last PSA:   PSA   Date Value Ref Range Status   01/23/2020 0.67 0 - 4 ug/L Final     Comment:     Assay Method:  Chemiluminescence using Siemens Vista analyzer       Reviewed orders with patient. Reviewed health maintenance and updated orders accordingly - Yes  Will return for labs in 1 week to obtain testosterone level    Reviewed and updated as needed this visit by clinical staff  Tobacco  Allergies  Meds  Med Hx  Surg Hx  Fam Hx  Soc Hx        Reviewed and updated as needed this visit by Provider        History reviewed. No pertinent past medical history.     Review of Systems  CONSTITUTIONAL: NEGATIVE for fever, chills, change in weight  INTEGUMENTARY/SKIN: NEGATIVE for worrisome rashes, moles or lesions  EYES: NEGATIVE for vision changes or irritation  ENT: NEGATIVE for ear, mouth and throat problems  RESP: NEGATIVE for significant cough or SOB  CV: NEGATIVE for chest pain, palpitations or peripheral edema  GI: NEGATIVE for nausea, abdominal pain, heartburn, or change in bowel habits   male: negative for dysuria, hematuria, decreased urinary stream, erectile dysfunction, urethral discharge  MUSCULOSKELETAL: NEGATIVE for significant arthralgias or myalgia  NEURO: NEGATIVE for weakness, dizziness or paresthesias  PSYCHIATRIC: NEGATIVE for changes in mood or affect    OBJECTIVE:   /78 (BP Location: Right arm, Patient Position: Sitting, Cuff Size: Adult Large)   Pulse 93   Temp 97.5  F (36.4  C) (Tympanic)   Resp 18   Ht 1.651 m (5' 5\")   Wt 92.5 kg (203 lb 14.4 oz)   SpO2 98%   BMI 33.93 kg/m      Physical Exam  GENERAL: healthy, alert and no distress  EYES: Eyes grossly normal to inspection, PERRL and conjunctivae and sclerae normal  HENT: ear canals and TM's normal, nose and mouth without ulcers or lesions  NECK: no adenopathy, no asymmetry, masses, or scars and thyroid normal to palpation  RESP: lungs clear to auscultation - " no rales, rhonchi or wheezes  CV: regular rate and rhythm, normal S1 S2, no S3 or S4, no murmur, click or rub, no peripheral edema and peripheral pulses strong  ABDOMEN: soft, nontender, no hepatosplenomegaly, no masses and bowel sounds normal  MS: no gross musculoskeletal defects noted, no edema  SKIN: no suspicious lesions or rashes  NEURO: Normal strength and tone, mentation intact and speech normal  PSYCH: mentation appears normal, affect normal/bright  Diabetic foot exam: normal DP and PT pulses, no trophic changes or ulcerative lesions, normal sensory exam and normal monofilament exam    Diagnostic Test Results:  Labs reviewed in Epic    ASSESSMENT/PLAN:   1. Low testosterone  Testosterone low at next check.  Notes that he feels like his testosterone is low at week 3 of the month.  While his symptoms at week 3 do not fully make sense to e given that he does not experience them week 1 when his testosterones level should be in the same place I am worried that his testosterone level may be low.  Will obtain level next week (1 week after injection) to assess level and change dose as needed.  - CBC with platelets; Future  - Testosterone total; Future    2. Type 2 diabetes mellitus without complication, without long-term current use of insulin (H)  Most recent Aic was 6.8.  Will obtain recheck.  Discussed that based on prior level he has diabetes.  Foot exam normal.  Knows that he needs an eye exam but working on finding a place that takes his insurance.  He is interested in controlling his diabetes with diet and exercise and is not interested in starting a medication at this time.  - BASIC METABOLIC PANEL; Future  - Lipid panel reflex to direct LDL Fasting; Future  - Albumin Random Urine Quantitative with Creat Ratio; Future  - HEMOGLOBIN A1C; Future  - EYE ADULT REFERRAL; Future    COUNSELING:   Reviewed preventive health counseling, as reflected in patient instructions       Regular exercise       Healthy  "diet/nutrition    Estimated body mass index is 33.93 kg/m  as calculated from the following:    Height as of this encounter: 1.651 m (5' 5\").    Weight as of this encounter: 92.5 kg (203 lb 14.4 oz).     Weight management plan: Discussed healthy diet and exercise guidelines    He reports that he has never smoked. He has never used smokeless tobacco.      Counseling Resources:  ATP IV Guidelines  Pooled Cohorts Equation Calculator  FRAX Risk Assessment  ICSI Preventive Guidelines  Dietary Guidelines for Americans, 2010  USDA's MyPlate  ASA Prophylaxis  Lung CA Screening    Molly Yoon MD  [facility] [place]    I have seen and discussed the patient with Dr. Yoon and agree with the jointly developed plan as documented above.      ICD-10-CM    1. Routine general medical examination at a health care facility  Z00.00    2. Low testosterone  R79.89 CBC with platelets     Testosterone total   3. Type 2 diabetes mellitus without complication, without long-term current use of insulin (H)  E11.9 BASIC METABOLIC PANEL     Lipid panel reflex to direct LDL Fasting     Albumin Random Urine Quantitative with Creat Ratio     HEMOGLOBIN A1C     EYE ADULT REFERRAL     Will recheck testosterone level between 8-10am after 1 week of injection to determine if fatigue may be due in part to underdosing testosterone. Patient declined medications for newly diagnosed Type 2 DM, prefers to try lifestyle intervention. Declines DM Ed.     Rosmery Hammond MD  Internal Medicine-Pediatrics      "

## 2020-09-25 DIAGNOSIS — E11.9 TYPE 2 DIABETES MELLITUS WITHOUT COMPLICATION, WITHOUT LONG-TERM CURRENT USE OF INSULIN (H): ICD-10-CM

## 2020-09-25 DIAGNOSIS — R79.89 LOW TESTOSTERONE: ICD-10-CM

## 2020-09-25 LAB
ANION GAP SERPL CALCULATED.3IONS-SCNC: 9 MMOL/L (ref 3–14)
BUN SERPL-MCNC: 22 MG/DL (ref 7–30)
CALCIUM SERPL-MCNC: 8.8 MG/DL (ref 8.5–10.1)
CHLORIDE SERPL-SCNC: 106 MMOL/L (ref 94–109)
CHOLEST SERPL-MCNC: 212 MG/DL
CO2 SERPL-SCNC: 23 MMOL/L (ref 20–32)
CREAT SERPL-MCNC: 1.09 MG/DL (ref 0.66–1.25)
ERYTHROCYTE [DISTWIDTH] IN BLOOD BY AUTOMATED COUNT: 12.6 % (ref 10–15)
GFR SERPL CREATININE-BSD FRML MDRD: 81 ML/MIN/{1.73_M2}
GLUCOSE SERPL-MCNC: 128 MG/DL (ref 70–99)
HBA1C MFR BLD: 6.2 % (ref 0–5.6)
HCT VFR BLD AUTO: 47.2 % (ref 40–53)
HDLC SERPL-MCNC: 43 MG/DL
HGB BLD-MCNC: 15.8 G/DL (ref 13.3–17.7)
LDLC SERPL CALC-MCNC: 137 MG/DL
MCH RBC QN AUTO: 30.2 PG (ref 26.5–33)
MCHC RBC AUTO-ENTMCNC: 33.5 G/DL (ref 31.5–36.5)
MCV RBC AUTO: 90 FL (ref 78–100)
NONHDLC SERPL-MCNC: 169 MG/DL
PLATELET # BLD AUTO: 216 10E9/L (ref 150–450)
POTASSIUM SERPL-SCNC: 4.1 MMOL/L (ref 3.4–5.3)
RBC # BLD AUTO: 5.23 10E12/L (ref 4.4–5.9)
SODIUM SERPL-SCNC: 138 MMOL/L (ref 133–144)
TRIGL SERPL-MCNC: 161 MG/DL
WBC # BLD AUTO: 8.7 10E9/L (ref 4–11)

## 2020-09-25 PROCEDURE — 83036 HEMOGLOBIN GLYCOSYLATED A1C: CPT | Performed by: INTERNAL MEDICINE

## 2020-09-25 PROCEDURE — 82043 UR ALBUMIN QUANTITATIVE: CPT | Performed by: INTERNAL MEDICINE

## 2020-09-25 PROCEDURE — 80061 LIPID PANEL: CPT | Performed by: INTERNAL MEDICINE

## 2020-09-25 PROCEDURE — 36415 COLL VENOUS BLD VENIPUNCTURE: CPT | Performed by: INTERNAL MEDICINE

## 2020-09-25 PROCEDURE — 84403 ASSAY OF TOTAL TESTOSTERONE: CPT | Performed by: INTERNAL MEDICINE

## 2020-09-25 PROCEDURE — 85027 COMPLETE CBC AUTOMATED: CPT | Performed by: INTERNAL MEDICINE

## 2020-09-25 PROCEDURE — 80048 BASIC METABOLIC PNL TOTAL CA: CPT | Performed by: INTERNAL MEDICINE

## 2020-09-26 LAB
CREAT UR-MCNC: 205 MG/DL
MICROALBUMIN UR-MCNC: 6 MG/L
MICROALBUMIN/CREAT UR: 2.98 MG/G CR (ref 0–17)
TESTOST SERPL-MCNC: 573 NG/DL (ref 240–950)

## 2020-09-27 DIAGNOSIS — E29.1 MALE HYPOGONADISM: ICD-10-CM

## 2020-09-27 DIAGNOSIS — R79.89 LOW TESTOSTERONE: ICD-10-CM

## 2020-09-28 NOTE — TELEPHONE ENCOUNTER
Routing refill request to provider for review/approval because:  Medication class requires provider approval.    Danyel MURILLO RN, BSN

## 2020-09-28 NOTE — TELEPHONE ENCOUNTER
Routing refill request to provider for review/approval because:  Drug not on the [facility] refill protocol     Gill Black RN   [facility] -- Triage Nurse

## 2020-10-08 RX ORDER — TESTOSTERONE CYPIONATE 200 MG/ML
INJECTION, SOLUTION INTRAMUSCULAR
Qty: 1 ML | Refills: 5 | Status: SHIPPED | OUTPATIENT
Start: 2020-10-08 | End: 2020-12-18

## 2020-10-08 RX ORDER — SYRINGE WITH NEEDLE, 1 ML 25GX5/8"
SYRINGE, EMPTY DISPOSABLE MISCELLANEOUS
Qty: 2 EACH | Refills: 0 | Status: SHIPPED | OUTPATIENT
Start: 2020-10-08 | End: 2020-11-30

## 2020-10-08 RX ORDER — NEEDLES, DISPOSABLE 25GX5/8"
NEEDLE, DISPOSABLE MISCELLANEOUS
Qty: 2 EACH | Refills: 0 | Status: SHIPPED | OUTPATIENT
Start: 2020-10-08 | End: 2020-11-30

## 2020-11-04 DIAGNOSIS — E29.1 MALE HYPOGONADISM: ICD-10-CM

## 2020-11-04 DIAGNOSIS — R79.89 LOW TESTOSTERONE: ICD-10-CM

## 2020-11-04 NOTE — TELEPHONE ENCOUNTER
Routing refill request to provider for review/approval because:  Drug not on the [facility] refill protocol     Gill Black RN   [facility] -- Triage Nurse

## 2020-11-30 RX ORDER — SYRINGE WITH NEEDLE, 1 ML 25GX5/8"
SYRINGE, EMPTY DISPOSABLE MISCELLANEOUS
Qty: 2 EACH | Refills: 0 | Status: SHIPPED | OUTPATIENT
Start: 2020-11-30 | End: 2021-03-26

## 2020-11-30 RX ORDER — NEEDLES, DISPOSABLE 25GX5/8"
NEEDLE, DISPOSABLE MISCELLANEOUS
Qty: 2 EACH | Refills: 0 | Status: SHIPPED | OUTPATIENT
Start: 2020-11-30 | End: 2021-03-26

## 2020-12-15 ENCOUNTER — VIRTUAL VISIT (OUTPATIENT)
Dept: PEDIATRICS | Facility: CLINIC | Age: 45
End: 2020-12-15
Payer: COMMERCIAL

## 2020-12-15 DIAGNOSIS — J20.9 ACUTE BRONCHITIS WITH SYMPTOMS > 10 DAYS: Primary | ICD-10-CM

## 2020-12-15 PROCEDURE — 99213 OFFICE O/P EST LOW 20 MIN: CPT | Mod: 95 | Performed by: INTERNAL MEDICINE

## 2020-12-15 RX ORDER — DOXYCYCLINE 100 MG/1
100 CAPSULE ORAL 2 TIMES DAILY
Qty: 20 CAPSULE | Refills: 0 | Status: SHIPPED | OUTPATIENT
Start: 2020-12-15 | End: 2020-12-21 | Stop reason: ALTCHOICE

## 2020-12-15 RX ORDER — BENZONATATE 200 MG/1
200 CAPSULE ORAL 3 TIMES DAILY PRN
Qty: 60 CAPSULE | Refills: 0 | Status: SHIPPED | OUTPATIENT
Start: 2020-12-15 | End: 2021-11-18

## 2020-12-15 RX ORDER — ALBUTEROL SULFATE 90 UG/1
2 AEROSOL, METERED RESPIRATORY (INHALATION) EVERY 6 HOURS
Qty: 1 INHALER | Refills: 0 | Status: SHIPPED | OUTPATIENT
Start: 2020-12-15

## 2020-12-15 NOTE — PROGRESS NOTES
"Darrell Olivares is a 45 year old male who is being evaluated via a billable telephone visit.      The patient has been notified of following:     \"This telephone visit will be conducted via a call between you and your physician/provider. We have found that certain health care needs can be provided without the need for a physical exam.  This service lets us provide the care you need with a short phone conversation.  If a prescription is necessary we can send it directly to your pharmacy.  If lab work is needed we can place an order for that and you can then stop by our lab to have the test done at a later time.    Telephone visits are billed at different rates depending on your insurance coverage. During this emergency period, for some insurers they may be billed the same as an in-person visit.  Please reach out to your insurance provider with any questions.    If during the course of the call the physician/provider feels a telephone visit is not appropriate, you will not be charged for this service.\"    Patient has given verbal consent for Telephone visit?  Yes    What phone number would you like to be contacted at? [phone]    How would you like to obtain your AVS? Mail a copy    Subjective     Darrell Olivares is a 45 year old male who presents via phone visit today for the following health issues:    HPI     Acute Illness  Acute illness concerns: Cough - wants robitussin with codeine  Onset/Duration: 1 month or more  Symptoms: has a lot of  Mucous, feels like has things in his throat.  Drinks water and it doesn't go away.  Nose draining then to throat and started coughing.  Chest hurts when coughs a lot.  Nothing makes it better or worse.  Is not worse at night.  Worse when standing up - has more drainage.  Exercise makes it worse as well - cannot do running or cardio now.  Can lift weights  Fever: no  Chills/Sweats: no  Headache (location?): no  Sinus Pressure: no  Conjunctivitis:  no  Ear Pain: " no  Rhinorrhea: YES  Congestion: no - but does get chest ache from coughing so much  Sore Throat: no  Cough: YES-productive of clear sputum  Wheeze: no  Decreased Appetite: no  Nausea: no  Vomiting: no  Diarrhea: no  Dysuria/Freq.: no  Dysuria or Hematuria: no  Fatigue/Achiness: no  Sick/Strep Exposure: no  Therapies tried and outcome: allergy pill - does not work, sudafed - does not work, nyquil - helps him sleep, has been on robitussin w/ codeine in past and worked well.  Reviewed PDMP.    Had similar episode 3.5 yrs ago.  Diagnosed with bronchitis and given azithromycin and benzonatate and codeine.       Eye exam? Has not done yet        Review of Systems          Objective          Vitals:  No vitals were obtained today due to virtual visit.    healthy, alert and no distress  PSYCH: Alert and oriented times 3; coherent speech, normal   rate and volume, able to articulate logical thoughts, able   to abstract reason, no tangential thoughts, no hallucinations   or delusions  His affect is normal  RESP: occasional dry cough, no audible wheezing, able to talk in full sentences  Remainder of exam unable to be completed due to telephone visits          Assessment/Plan:    Assessment & Plan     Acute bronchitis with symptoms > 10 days  Discussed.  Avoid codeine.  Medications per orders to cover possible sinusitis in addition to bronchitis.  Inhaler use discussed.  Notify if not better 2-3d and would do prednisone prescription as well  - doxycycline hyclate (VIBRAMYCIN) 100 MG capsule; Take 1 capsule (100 mg) by mouth 2 times daily for 10 days  - albuterol (PROAIR HFA/PROVENTIL HFA/VENTOLIN HFA) 108 (90 Base) MCG/ACT inhaler; Inhale 2 puffs into the lungs every 6 hours  - benzonatate (TESSALON) 200 MG capsule; Take 1 capsule (200 mg) by mouth 3 times daily as needed for cough        See Patient Instructions    Return in about 3 days (around 12/18/2020), or if symptoms worsen or fail to improve.    MD KYARA Ritter  [facility] [place]    Phone call duration:  10 minutes

## 2020-12-17 DIAGNOSIS — E29.1 MALE HYPOGONADISM: ICD-10-CM

## 2020-12-17 DIAGNOSIS — R79.89 LOW TESTOSTERONE: ICD-10-CM

## 2020-12-17 NOTE — TELEPHONE ENCOUNTER
Routing refill request to provider for review/approval because:  Drug not on the [facility] refill protocol   Angela Montesinos RN, BSN

## 2020-12-18 RX ORDER — TESTOSTERONE CYPIONATE 200 MG/ML
INJECTION, SOLUTION INTRAMUSCULAR
Qty: 2 ML | Refills: 2 | Status: SHIPPED | OUTPATIENT
Start: 2020-12-18 | End: 2021-04-21

## 2020-12-21 ENCOUNTER — TELEPHONE (OUTPATIENT)
Dept: PEDIATRICS | Facility: CLINIC | Age: 45
End: 2020-12-21

## 2020-12-21 DIAGNOSIS — J01.90 ACUTE SINUSITIS WITH SYMPTOMS > 10 DAYS: Primary | ICD-10-CM

## 2020-12-21 RX ORDER — PREDNISONE 20 MG/1
40 TABLET ORAL DAILY
Qty: 10 TABLET | Refills: 0 | Status: SHIPPED | OUTPATIENT
Start: 2020-12-21 | End: 2021-11-18

## 2020-12-21 NOTE — TELEPHONE ENCOUNTER
Call placed to pt with message from provider    Pt verbalized understanding and agrees to the plan    Thank you  Lashell Bach RN on 12/21/2020 at 3:55 PM

## 2020-12-21 NOTE — TELEPHONE ENCOUNTER
Reason for call:  Other   Patient called regarding (reason for call): call back  Additional comments: Pt requesting call back. Medication he was perscribed 12/15 is not working    Phone number to reach patient:  Home number on file [phone] (home)    Best Time:  any    Can we leave a detailed message on this number?  YES    Travel screening: Not Applicable

## 2020-12-21 NOTE — TELEPHONE ENCOUNTER
"Called the pt.    States that their symptoms have not improved since starting treatment prescribed with visit on 12/15/20.    Pt states they are still experiencing productive cough with phlegm, runny nose, but does states that chest pain has improved slightly.    Pt is currently on day 7 of 10 of doxycyline regimen. Pt is using inhaler every 6 hours without relief and states that tessalon not helping with cough. Pt report drinking plenty of water.    States that they have not been able to work out or run due to cough and runny nose.      Dr Dobson: Please advise.      - Albert \"Troy\" DOMINIC Rich - Patient Advocate Liason (PAL)  [facility]    "

## 2020-12-21 NOTE — TELEPHONE ENCOUNTER
Please call pt: Should stop doxy and start augmentin.  Start prednisone to help with inflammation in lung and sinuses.  If not better in 2-3d, needs to be seen for FtF visit.

## 2021-01-15 ENCOUNTER — OFFICE VISIT (OUTPATIENT)
Dept: PEDIATRICS | Facility: CLINIC | Age: 46
End: 2021-01-15
Payer: COMMERCIAL

## 2021-01-15 VITALS
WEIGHT: 210.4 LBS | TEMPERATURE: 97 F | DIASTOLIC BLOOD PRESSURE: 60 MMHG | BODY MASS INDEX: 35.01 KG/M2 | HEART RATE: 81 BPM | OXYGEN SATURATION: 97 % | SYSTOLIC BLOOD PRESSURE: 112 MMHG

## 2021-01-15 DIAGNOSIS — R05.9 COUGH: Primary | ICD-10-CM

## 2021-01-15 PROCEDURE — 99213 OFFICE O/P EST LOW 20 MIN: CPT | Performed by: FAMILY MEDICINE

## 2021-01-15 RX ORDER — CODEINE PHOSPHATE AND GUAIFENESIN 10; 100 MG/5ML; MG/5ML
SOLUTION ORAL
Qty: 240 ML | Refills: 1 | Status: SHIPPED | OUTPATIENT
Start: 2021-01-15 | End: 2021-01-15

## 2021-01-15 RX ORDER — CODEINE PHOSPHATE AND GUAIFENESIN 10; 100 MG/5ML; MG/5ML
SOLUTION ORAL
Qty: 240 ML | Refills: 1 | Status: SHIPPED | OUTPATIENT
Start: 2021-01-15 | End: 2021-11-18

## 2021-01-15 RX ORDER — PREDNISONE 20 MG/1
TABLET ORAL
Qty: 21 TABLET | Refills: 0 | Status: SHIPPED | OUTPATIENT
Start: 2021-01-15 | End: 2021-11-18

## 2021-01-15 NOTE — PROGRESS NOTES
"  (R05) Cough  (primary encounter diagnosis)  Comment:     Consider cough variant asthma - refer pulm.  Trial of steroid over longer interval.    Plan: guaiFENesin-codeine (ROBITUSSIN AC) 100-10         MG/5ML solution, predniSONE (DELTASONE) 20 MG         tablet, PULMONARY MEDICINE REFERRAL,         DISCONTINUED: guaiFENesin-codeine (ROBITUSSIN         AC) 100-10 MG/5ML solution                Candido Ramírez is a 45 year old who presents to clinic today for the following health issues - cough, persistent.    HPI       Cough follow up has been ongoing for years  Was seen in UC 12/05/20 and vrt 12/15/20    Has been on Doxy, Prednisone, Tessalon, Inhaler.  Not on ACE.    Has persistent cough. Minimally productive. No fever.    Non smoker.    He says Jose DOMINGUEZ helped in past.      Patient Active Problem List   Diagnosis     Low testosterone     Male hypogonadism     Type 2 diabetes mellitus without complication, without long-term current use of insulin (H)     Current Outpatient Medications   Medication Sig Dispense Refill     guaiFENesin-codeine (ROBITUSSIN AC) 100-10 MG/5ML solution 2-3 tsp every 4 hr if needed for pain 240 mL 1     predniSONE (DELTASONE) 20 MG tablet Take 2 daily for 7 days, then 1 daily for 7 days. 21 tablet 0     albuterol (PROAIR HFA/PROVENTIL HFA/VENTOLIN HFA) 108 (90 Base) MCG/ACT inhaler Inhale 2 puffs into the lungs every 6 hours 1 Inhaler 0     ASPIRIN NOT PRESCRIBED (INTENTIONAL) Please choose reason not prescribed, below 1 each 0     BD DISP NEEDLES 18G X 1-1/2\" MISC USE ONE NEEDLE EVERY 14 DAYS TO DRAW UP TESTOSTERONE, SWITCH NEEDLE BEFORE INJECTION 2 each 0     BD INTEGRA SYRINGE 25G X 1\" 3 ML MISC USE ONE SYRINGE TO INJECT TESTOSTERONE EVERY 14 DAYS 2 each 3     benzonatate (TESSALON) 200 MG capsule Take 1 capsule (200 mg) by mouth 3 times daily as needed for cough 60 capsule 0     predniSONE (DELTASONE) 20 MG tablet Take 2 tablets (40 mg) by mouth daily 10 tablet 0     " "Syringe/Needle, Disp, (BD LUER-SINA SYRINGE) 25G X 1-1/2\" 3 ML MISC USE ONE SYRINGE TO INJCET TESTOSTERONE EVERY 14 DAYS 2 each 0     testosterone cypionate (DEPOTESTOSTERONE) 200 MG/ML injection INJECT 1ML INTO THE MUSCLE EVERY 14 DAYS 2 mL 2         Review of Systems     No fever  No wheeze or SOB  No CP  No nausea  No edema        History reviewed. No pertinent past medical history.          Objective    /60 (BP Location: Right arm, Patient Position: Chair, Cuff Size: Adult Regular)   Pulse 81   Temp 97  F (36.1  C) (Tympanic)   Wt 95.4 kg (210 lb 6.4 oz)   SpO2 97%   BMI 35.01 kg/m    Body mass index is 35.01 kg/m .  Physical Exam     Large man, NAD  Nasal membranes moderate swelling  Pharynx nl  Neck no adenopathy  Chest clear  CV RSR, no murmur        "

## 2021-01-18 ENCOUNTER — TELEPHONE (OUTPATIENT)
Dept: PEDIATRICS | Facility: CLINIC | Age: 46
End: 2021-01-18

## 2021-01-18 NOTE — TELEPHONE ENCOUNTER
Prior Authorization Retail Medication Request    Medication/Dose: testosterone cypionate (DEPOTESTOSTERONE) 200 MG/ML injection  ICD code (if different than what is on RX): [E29.1]  [R79.89]   Previously Tried and Failed: testosterone (ANDROGEL 1%) 25 MG/2.5GM (1%) gel   Rationale:   [E29.1]  [R79.89]     Insurance Name:  [facility]  Insurance ID:  [id number]      Pharmacy Information (if different than what is on RX)  [facility]  Phone:  [phone]    Valeria Lopez MA 11:43 AM 1/18/2021

## 2021-01-20 NOTE — TELEPHONE ENCOUNTER
Central Prior Authorization Team   Phone: [phone]      PA Initiation    Medication: testosterone cypionate (DEPOTESTOSTERONE) 200 MG/ML injection  Insurance Company:    Pharmacy Filling the Rx: [id number] - [place], [place] - [address]  Filling Pharmacy Phone: [phone]  Filling Pharmacy Fax:    Start Date: 1/20/2021

## 2021-01-21 NOTE — TELEPHONE ENCOUNTER
Prior Authorization Approval    Authorization Effective Date: 1/20/2021  Authorization Expiration Date: 1/20/2022  Medication: testosterone cypionate (DEPOTESTOSTERONE) 200 MG/ML injection  Approved Dose/Quantity:    Reference #:     Insurance Company:    Expected CoPay:       CoPay Card Available:      Foundation Assistance Needed:    Which Pharmacy is filling the prescription (Not needed for infusion/clinic administered): [id number] - [place], [place] - [address]  Pharmacy Notified: Yes  Patient Notified: Yes  **Instructed pharmacy to notify patient when script is ready to /ship.**

## 2021-01-27 ENCOUNTER — TRANSFERRED RECORDS (OUTPATIENT)
Dept: HEALTH INFORMATION MANAGEMENT | Facility: CLINIC | Age: 46
End: 2021-01-27

## 2021-02-25 ENCOUNTER — TELEPHONE (OUTPATIENT)
Dept: PEDIATRICS | Facility: CLINIC | Age: 46
End: 2021-02-25

## 2021-02-25 NOTE — TELEPHONE ENCOUNTER
Patient left voicemail on PAL direct line.     Called patient back.    Patient reports that he had an appointment with pulmonology. Patient would like to know the results of the tests that pulmonology ordered, and the plan.     Advised the patient to contact Pulmonology provider for results.     Patient verbalized understanding.     Patient was seen by Dr. Talamantes on 1/15/21, and was referred to pulmonology.   Routing to Dr. Talamantes to advise.     -Patient would like to know the results and plan after recent PFT and Sinus CT ordered by Pulmonology.   Dr. Talamantes was CC'd in the transferred records in epic.    Alexander Sims RN on 2/25/2021 at 8:34 AM

## 2021-02-25 NOTE — TELEPHONE ENCOUNTER
"  Dr. Talamantes gave the following interpretation of the test results in a routing comment:   \"PFT's were normal.   No obstructive disease like asthma or COPD.   Lung volumes were normal.\"      Called patient.    Above result note from Dr. Talamantes relayed to the patient.     Patient reports that Pulmonology said to follow up with Dr. Talamantes and primary care for results and plan.     Patient is still having the same symptoms as he had with visit with Dr. Talamantes on 1/15/21. Cough.     Routing to Dr. Talamantes to advise:    -What is the plan going forward?  Are more tests recommended at this time?  Should patient continue to monitor and schedule follow up visit with provider if symptoms do not improve?    Alexander Sims RN on 2/25/2021 at 4:33 PM        "

## 2021-04-14 ENCOUNTER — OFFICE VISIT (OUTPATIENT)
Dept: URGENT CARE | Facility: URGENT CARE | Age: 46
End: 2021-04-14
Payer: OTHER MISCELLANEOUS

## 2021-04-14 VITALS
HEART RATE: 85 BPM | DIASTOLIC BLOOD PRESSURE: 65 MMHG | TEMPERATURE: 98.4 F | OXYGEN SATURATION: 98 % | SYSTOLIC BLOOD PRESSURE: 113 MMHG

## 2021-04-14 DIAGNOSIS — S61.411A LACERATION OF RIGHT HAND WITHOUT FOREIGN BODY, INITIAL ENCOUNTER: Primary | ICD-10-CM

## 2021-04-14 PROCEDURE — 12001 RPR S/N/AX/GEN/TRNK 2.5CM/<: CPT | Performed by: NURSE PRACTITIONER

## 2021-04-14 NOTE — PROGRESS NOTES
SUBJECTIVE:     Chief Complaint   Patient presents with     Urgent Care     Laceration     sliced r hand  on a juicer machine at work this AM     Darrell Olivares is a 45 year old male who presents to the clinic with a laceration on the right hand sustained 5 hour(s) ago.  This is a work related injury.    Mechanism of injury: sharp juicer metal piece sliced his hand at [employer]    Associated symptoms: Denies numbness, weakness, or loss of function  Last tetanus booster within 10 years: yes 03/2013    EXAM:   The patient appears today in alert, no apparent distress  VITALS: /65   Pulse 85   Temp 98.4  F (36.9  C) (Tympanic)   SpO2 98%     Size of laceration: 2 centimeters  Characteristics of the laceration: clean, longitudinal, straight and superficial, no longer bleeding  Tendon function intact: yes  Sensation to light touch intact: yes  Pulses intact: yes    Assessment:  Laceration of right hand without foreign body, initial encounter    PLAN:  PROCEDURE NOTE:  Wound cleaned with HIBICLENS  Wound cleaned with sterile water  Wound soaked  Wound irrigated  Dermabond was applied    After care instructions:  Discussed option for glue versus sutures, we decided on glue given superficial cut without bleeding  Keep wound clean and dry for the next 2-3 days  Do not scrub, soak, sweat, or rub the area  Wound glue acts as a scab and will slough off over the next week  Signs of infection discussed today  May return to work as long as wound is kept clean and dry  Discussed the probability of scarring

## 2021-04-14 NOTE — PATIENT INSTRUCTIONS
Discussed option for glue versus sutures, we decided on glue given superficial cut without bleeding  Keep wound clean and dry for the next 2-3 days  Do not scrub, soak, sweat, or rub the area  Wound glue acts as a scab and will slough off over the next week  Signs of infection discussed today  May return to work as long as wound is kept clean and dry  Discussed the probability of scarring

## 2021-04-19 DIAGNOSIS — R79.89 LOW TESTOSTERONE: ICD-10-CM

## 2021-04-19 DIAGNOSIS — E29.1 MALE HYPOGONADISM: ICD-10-CM

## 2021-04-21 RX ORDER — TESTOSTERONE CYPIONATE 200 MG/ML
200 INJECTION, SOLUTION INTRAMUSCULAR
Qty: 2 ML | Refills: 2 | Status: SHIPPED | OUTPATIENT
Start: 2021-04-21 | End: 2021-10-05

## 2021-04-21 NOTE — TELEPHONE ENCOUNTER
Kristy call.  I refilled.  But needs first morning labs to check levels; please have him schedule.    Luke Ramos MD  Internal Medicine and Pediatrics

## 2021-04-21 NOTE — TELEPHONE ENCOUNTER
Routing refill request to provider for review/approval because:  Labs not current:  Liver labs , PSA    Medication needs review per protocol.     Gill Black RN   [facility] -- Triage Nurse

## 2021-04-22 ENCOUNTER — OFFICE VISIT (OUTPATIENT)
Dept: PEDIATRICS | Facility: CLINIC | Age: 46
End: 2021-04-22
Payer: COMMERCIAL

## 2021-04-22 VITALS
TEMPERATURE: 97 F | SYSTOLIC BLOOD PRESSURE: 100 MMHG | OXYGEN SATURATION: 98 % | RESPIRATION RATE: 18 BRPM | HEART RATE: 97 BPM | WEIGHT: 206.5 LBS | BODY MASS INDEX: 34.36 KG/M2 | DIASTOLIC BLOOD PRESSURE: 82 MMHG

## 2021-04-22 DIAGNOSIS — R79.89 LOW TESTOSTERONE: ICD-10-CM

## 2021-04-22 DIAGNOSIS — E29.1 MALE HYPOGONADISM: ICD-10-CM

## 2021-04-22 DIAGNOSIS — K21.9 GASTROESOPHAGEAL REFLUX DISEASE WITHOUT ESOPHAGITIS: ICD-10-CM

## 2021-04-22 DIAGNOSIS — Z11.59 NEED FOR HEPATITIS C SCREENING TEST: ICD-10-CM

## 2021-04-22 DIAGNOSIS — R09.89 CHRONIC THROAT CLEARING: ICD-10-CM

## 2021-04-22 DIAGNOSIS — R09.82 PND (POST-NASAL DRIP): ICD-10-CM

## 2021-04-22 DIAGNOSIS — E11.9 TYPE 2 DIABETES MELLITUS WITHOUT COMPLICATION, WITHOUT LONG-TERM CURRENT USE OF INSULIN (H): Primary | ICD-10-CM

## 2021-04-22 LAB
BASOPHILS # BLD AUTO: 0 10E9/L (ref 0–0.2)
BASOPHILS NFR BLD AUTO: 0.3 %
DIFFERENTIAL METHOD BLD: NORMAL
EOSINOPHIL # BLD AUTO: 0.1 10E9/L (ref 0–0.7)
EOSINOPHIL NFR BLD AUTO: 1.6 %
ERYTHROCYTE [DISTWIDTH] IN BLOOD BY AUTOMATED COUNT: 11.9 % (ref 10–15)
HBA1C MFR BLD: 7.3 % (ref 0–5.6)
HCT VFR BLD AUTO: 44.5 % (ref 40–53)
HGB BLD-MCNC: 15.2 G/DL (ref 13.3–17.7)
LYMPHOCYTES # BLD AUTO: 2.6 10E9/L (ref 0.8–5.3)
LYMPHOCYTES NFR BLD AUTO: 35 %
MCH RBC QN AUTO: 30.9 PG (ref 26.5–33)
MCHC RBC AUTO-ENTMCNC: 34.2 G/DL (ref 31.5–36.5)
MCV RBC AUTO: 90 FL (ref 78–100)
MONOCYTES # BLD AUTO: 0.6 10E9/L (ref 0–1.3)
MONOCYTES NFR BLD AUTO: 8.4 %
NEUTROPHILS # BLD AUTO: 4 10E9/L (ref 1.6–8.3)
NEUTROPHILS NFR BLD AUTO: 54.7 %
PLATELET # BLD AUTO: 248 10E9/L (ref 150–450)
RBC # BLD AUTO: 4.92 10E12/L (ref 4.4–5.9)
WBC # BLD AUTO: 7.3 10E9/L (ref 4–11)

## 2021-04-22 PROCEDURE — 36415 COLL VENOUS BLD VENIPUNCTURE: CPT | Performed by: NURSE PRACTITIONER

## 2021-04-22 PROCEDURE — 83036 HEMOGLOBIN GLYCOSYLATED A1C: CPT | Performed by: NURSE PRACTITIONER

## 2021-04-22 PROCEDURE — 86803 HEPATITIS C AB TEST: CPT | Performed by: NURSE PRACTITIONER

## 2021-04-22 PROCEDURE — 99213 OFFICE O/P EST LOW 20 MIN: CPT | Performed by: NURSE PRACTITIONER

## 2021-04-22 PROCEDURE — G0103 PSA SCREENING: HCPCS | Performed by: INTERNAL MEDICINE

## 2021-04-22 PROCEDURE — 80053 COMPREHEN METABOLIC PANEL: CPT | Performed by: INTERNAL MEDICINE

## 2021-04-22 PROCEDURE — 85025 COMPLETE CBC W/AUTO DIFF WBC: CPT | Performed by: INTERNAL MEDICINE

## 2021-04-22 RX ORDER — MONTELUKAST SODIUM 10 MG/1
10 TABLET ORAL AT BEDTIME
Qty: 90 TABLET | Refills: 1 | Status: SHIPPED | OUTPATIENT
Start: 2021-04-22 | End: 2021-11-18

## 2021-04-22 NOTE — PROGRESS NOTES
"Assessment & Plan     Type 2 diabetes mellitus without complication, without long-term current use of insulin (H)  Managed with diet.  Continue diet.    - HEMOGLOBIN A1C    PND (post-nasal drip)  Start on montelukast to help with PND  - montelukast (SINGULAIR) 10 MG tablet; Take 1 tablet (10 mg) by mouth At Bedtime    Gastroesophageal reflux disease without esophagitis  Continue medication given by [facility]  We will obtain records from clinic    Chronic throat clearing  Continue with medication    Need for hepatitis C screening test  - Hepatitis C Screen Reflex to HCV RNA Quant and Genotype      I spent a total of 10 minutes on the day of the visit.   Time spent doing chart review, history and exam, documentation and further activities per the note       BMI:   Estimated body mass index is 34.36 kg/m  as calculated from the following:    Height as of 9/2/20: 1.651 m (5' 5\").    Weight as of this encounter: 93.7 kg (206 lb 8 oz).   Weight management plan: Discussed healthy diet and exercise guidelines    See Patient Instructions=  Return if symptoms worsen or fail to improve.    Chrissy Andrews NP  [facility] [place] Ramírez is a 45 year old who presents for the following health issues:    HPI     Diabetes Follow-up      How often are you checking your blood sugar? Not at all    What concerns do you have today about your diabetes? None     Do you have any of these symptoms? (Select all that apply)  No numbness or tingling in feet.  No redness, sores or blisters on feet.  No complaints of excessive thirst.  No reports of blurry vision.  No significant changes to weight.    Have you had a diabetic eye exam in the last 12 months? No      BP Readings from Last 2 Encounters:   04/22/21 100/82   04/14/21 113/65     Hemoglobin A1C (%)   Date Value   09/25/2020 6.2 (H)   01/23/2020 6.8 (H)     LDL Cholesterol Calculated (mg/dL)   Date Value   09/25/2020 137 (H)   05/08/2019 145 (H) "       Patient here for lab work today and follow-up on his cough.  Warm tumeric milk helps with the cough.  Cough still present.  Saw [facility] in January 2021.  Given acid reflux medication but feels as though it didn't help.  Unsure what medication he is on.    Patient clearing throat due PND.   Needs to complete orders from Dr. Ramos  Has been watching his diet.    Review of Systems   Constitutional, HEENT, cardiovascular, pulmonary, gi and gu systems are negative, except as otherwise noted.      Objective    /82 (BP Location: Right arm, Patient Position: Sitting, Cuff Size: Adult Large)   Pulse 97   Temp 97  F (36.1  C) (Tympanic)   Resp 18   Wt 93.7 kg (206 lb 8 oz)   SpO2 98%   BMI 34.36 kg/m    Body mass index is 34.36 kg/m .     Physical Exam   GENERAL: healthy, alert and no distress  HENT: oropharynx clear and oral mucous membranes moist  RESP: lungs clear to auscultation - no rales, rhonchi or wheezes  CV: regular rate and rhythm, normal S1 S2, no S3 or S4, no murmur, click or rub, no peripheral edema and peripheral pulses strong  PSYCH: mentation appears normal, affect normal/bright

## 2021-04-22 NOTE — LETTER
May 11, 2021      Darrell Olivares  [address]   [place] [address]        Dear Olivares,        We have been trying to reach you so we can assist in scheduling your DM follow up for July.  Dr. Ramos's schedule fills up quickly so we are wanting to do this soon.  You can schedule this appointment via [employer] or by calling [phone].  We look forward to seeing you in July.       Take care,      [facility] Care Team

## 2021-04-22 NOTE — PATIENT INSTRUCTIONS
Patient Education     Prediabetes  You have been diagnosed with prediabetes. This means that the level of sugar (glucose) in your blood is too high. If you have prediabetes, you are at risk for developing type 2 diabetes. Type 2 diabetes is diagnosed when the level of glucose in the blood reaches a certain high level. With prediabetes, it hasn t reached this point yet. But it's higher than normal. It is vital to make lifestyle changes to lower your blood sugar, improve your health, and prevent diabetes.       Why worry about prediabetes?  Prediabetes is a condition where the body s cells have trouble using glucose in the blood for energy. As a result, too much glucose stays in the blood. This can affect how your heart and blood vessels work. Without changes in diet and lifestyle, the problem can get worse. Once you have type 2 diabetes, it's ongoing (chronic). It needs to be managed for the rest of your life. Diabetes can harm the body and your health by damaging organs, such as your eyes and kidneys. It makes you more likely to have heart disease. And it can damage nerves and blood vessels.   Who is a risk for prediabetes?  The exact cause of prediabetes is not clear. But certain risk factors make a person more likely to have it. These include:     A family history of type 2 diabetes    Being overweight    Being older than age 45    Have high blood pressure or high cholesterol     Having had gestational diabetes    Not being physically active    Being ,  American, , , , or   Diagnosing prediabetes  Prediabetes may have no symptoms. Or you may have some of the symptoms of diabetes (see below). The diagnosis is made with a blood test. You may have 1 or more of these blood tests:      Fasting glucose test. Blood is taken and tested after you have fasted (not eaten) for at least 8 hours. A normal test result is 99 milligrams per deciliter  (mg/dL) or lower. Prediabetes is 100 mg/dL to 125 mg/dL. Diabetes is 126 mg/dL or higher.    Glucose tolerance test. Your blood sugar is measured before and after you drink a very sugary liquid. A normal test result is 139 milligrams per deciliter (mg/dL) or lower. Prediabetes is 140 mg/dL to 199 mg/dL. Diabetes is 200 mg/dL or higher.    Hemoglobin A1c (HbA1c). Your HbA1c is normal if it is below 5.7%. Prediabetes is 5.7% to 6.4%. Diabetes is 6.5% or higher.   Treating prediabetes  The best way to treat prediabetes is to lose at least 5% to 7% of your current weight and be more physically active by getting at least 150 minutes a week of physical activity (at least 30 minutes daily.) When sitting for long periods of time, get up for short sessions of light activity every 30 minutes. These changes help the body s cells use blood sugar better. Even a small amount of weight loss can help. Work with your healthcare provider to make a plan to eat well and be more active. Keep in mind that small changes can add up. Other changes in your lifestyle (or even taking certain medicines, such as metformin) may make you less likely to develop diabetes. Your provider can talk with you about these. Stopping smoking will decrease your risk of developing diabetes. Don't use e-cigarettes or vaping products. But you may gain some weight if you are not careful.   Ask your healthcare provider for a referral to a lifestyle intervention program. This program will help you get to and stay at a 7% weight loss and increase physical activity.   Follow-up  If not treated, prediabetes can turn into diabetes. This is a serious health condition. Take steps to stop this from happening. Follow the treatment plan you have been given. You may have your blood glucose tested again in about 12 to 18 months.   Diabetes symptoms   Let your healthcare provider know if you have any of the following:    Always feel very tired    Feel very thirsty or hungry  much of the time    Have to urinate often    Lose weight for no reason    Feel numbness or tingling in your fingers or toes    Have cuts or bruises that don t seem to heal    Have blurry vision  Mariela last reviewed this educational content on 6/1/2019 2000-2021 [employer]. All rights reserved. This information is not intended as a substitute for professional medical care. Always follow your healthcare professional's instructions.

## 2021-04-23 LAB
ALBUMIN SERPL-MCNC: 3.9 G/DL (ref 3.4–5)
ALP SERPL-CCNC: 114 U/L (ref 40–150)
ALT SERPL W P-5'-P-CCNC: 46 U/L (ref 0–70)
ANION GAP SERPL CALCULATED.3IONS-SCNC: 6 MMOL/L (ref 3–14)
AST SERPL W P-5'-P-CCNC: 20 U/L (ref 0–45)
BILIRUB SERPL-MCNC: 0.3 MG/DL (ref 0.2–1.3)
BUN SERPL-MCNC: 14 MG/DL (ref 7–30)
CALCIUM SERPL-MCNC: 8.6 MG/DL (ref 8.5–10.1)
CHLORIDE SERPL-SCNC: 105 MMOL/L (ref 94–109)
CO2 SERPL-SCNC: 28 MMOL/L (ref 20–32)
CREAT SERPL-MCNC: 1.03 MG/DL (ref 0.66–1.25)
GFR SERPL CREATININE-BSD FRML MDRD: 87 ML/MIN/{1.73_M2}
GLUCOSE SERPL-MCNC: 178 MG/DL (ref 70–99)
POTASSIUM SERPL-SCNC: 3.8 MMOL/L (ref 3.4–5.3)
PROT SERPL-MCNC: 6.7 G/DL (ref 6.8–8.8)
PSA SERPL-ACNC: 0.89 UG/L (ref 0–4)
SODIUM SERPL-SCNC: 138 MMOL/L (ref 133–144)

## 2021-04-26 LAB — HCV AB SERPL QL IA: NONREACTIVE

## 2021-04-29 NOTE — TELEPHONE ENCOUNTER
Patient has read [employer] message and is aware of below.     Closing encounter.    NEAL GUNN MA on 4/29/2021 at 11:03 AM

## 2021-06-11 ENCOUNTER — TELEPHONE (OUTPATIENT)
Dept: PEDIATRICS | Facility: CLINIC | Age: 46
End: 2021-06-11

## 2021-06-11 NOTE — TELEPHONE ENCOUNTER
Please call patient.  He needs to set up a diabetes mellitus follow up for later this summer (august, September) and dicuss cholesterol meds possibly.  Also needs to establish with me, who is listed as his primary care provider.     Luke Ramos MD  Internal Medicine and Pediatrics

## 2021-06-29 DIAGNOSIS — E11.9 TYPE 2 DIABETES MELLITUS WITHOUT COMPLICATION, WITHOUT LONG-TERM CURRENT USE OF INSULIN (H): Primary | ICD-10-CM

## 2021-06-29 DIAGNOSIS — R79.89 LOW TESTOSTERONE: ICD-10-CM

## 2021-06-30 RX ORDER — NEEDLES, DISPOSABLE 25GX5/8"
NEEDLE, DISPOSABLE MISCELLANEOUS
Qty: 6 EACH | Refills: 11 | Status: SHIPPED | OUTPATIENT
Start: 2021-06-30 | End: 2021-11-18

## 2021-06-30 NOTE — TELEPHONE ENCOUNTER
Routing refill request to provider for review/approval because:  Drug not on the [facility] refill protocol     Gill Black RN   [facility] -- Triage Nurse

## 2021-10-09 ENCOUNTER — HEALTH MAINTENANCE LETTER (OUTPATIENT)
Age: 46
End: 2021-10-09

## 2021-11-18 ENCOUNTER — OFFICE VISIT (OUTPATIENT)
Dept: PEDIATRICS | Facility: CLINIC | Age: 46
End: 2021-11-18
Payer: COMMERCIAL

## 2021-11-18 ENCOUNTER — MEDICAL CORRESPONDENCE (OUTPATIENT)
Dept: PEDIATRICS | Facility: CLINIC | Age: 46
End: 2021-11-18

## 2021-11-18 VITALS
DIASTOLIC BLOOD PRESSURE: 66 MMHG | TEMPERATURE: 97.2 F | WEIGHT: 211.6 LBS | HEIGHT: 66 IN | HEART RATE: 78 BPM | SYSTOLIC BLOOD PRESSURE: 103 MMHG | BODY MASS INDEX: 34.01 KG/M2 | OXYGEN SATURATION: 97 %

## 2021-11-18 DIAGNOSIS — E55.9 VITAMIN D DEFICIENCY: ICD-10-CM

## 2021-11-18 DIAGNOSIS — Z11.4 SCREENING FOR HIV (HUMAN IMMUNODEFICIENCY VIRUS): ICD-10-CM

## 2021-11-18 DIAGNOSIS — Z11.59 NEED FOR HEPATITIS C SCREENING TEST: ICD-10-CM

## 2021-11-18 DIAGNOSIS — Z13.220 SCREENING FOR HYPERLIPIDEMIA: ICD-10-CM

## 2021-11-18 DIAGNOSIS — Z12.5 SCREENING FOR PROSTATE CANCER: ICD-10-CM

## 2021-11-18 DIAGNOSIS — Z13.1 SCREENING FOR DIABETES MELLITUS: Primary | ICD-10-CM

## 2021-11-18 DIAGNOSIS — R79.89 LOW TESTOSTERONE: ICD-10-CM

## 2021-11-18 DIAGNOSIS — Z00.00 ROUTINE GENERAL MEDICAL EXAMINATION AT A HEALTH CARE FACILITY: ICD-10-CM

## 2021-11-18 DIAGNOSIS — E29.1 MALE HYPOGONADISM: ICD-10-CM

## 2021-11-18 LAB
ALBUMIN SERPL-MCNC: 3.7 G/DL (ref 3.4–5)
ALP SERPL-CCNC: 84 U/L (ref 40–150)
ALT SERPL W P-5'-P-CCNC: 41 U/L (ref 0–70)
ANION GAP SERPL CALCULATED.3IONS-SCNC: 4 MMOL/L (ref 3–14)
AST SERPL W P-5'-P-CCNC: 12 U/L (ref 0–45)
BILIRUB SERPL-MCNC: 0.9 MG/DL (ref 0.2–1.3)
BUN SERPL-MCNC: 15 MG/DL (ref 7–30)
CALCIUM SERPL-MCNC: 8.9 MG/DL (ref 8.5–10.1)
CHLORIDE BLD-SCNC: 105 MMOL/L (ref 94–109)
CHOLEST SERPL-MCNC: 174 MG/DL
CO2 SERPL-SCNC: 28 MMOL/L (ref 20–32)
CREAT SERPL-MCNC: 0.9 MG/DL (ref 0.66–1.25)
DEPRECATED CALCIDIOL+CALCIFEROL SERPL-MC: 23 UG/L (ref 20–75)
FASTING STATUS PATIENT QL REPORTED: YES
GFR SERPL CREATININE-BSD FRML MDRD: >90 ML/MIN/1.73M2
GLUCOSE BLD-MCNC: 170 MG/DL (ref 70–99)
HBA1C MFR BLD: 8.3 % (ref 0–5.6)
HCV AB SERPL QL IA: NONREACTIVE
HDLC SERPL-MCNC: 37 MG/DL
HIV 1+2 AB+HIV1 P24 AG SERPL QL IA: NONREACTIVE
LDLC SERPL CALC-MCNC: 116 MG/DL
NONHDLC SERPL-MCNC: 137 MG/DL
POTASSIUM BLD-SCNC: 4.1 MMOL/L (ref 3.4–5.3)
PROT SERPL-MCNC: 6.8 G/DL (ref 6.8–8.8)
PSA SERPL-MCNC: 1 UG/L (ref 0–4)
SODIUM SERPL-SCNC: 137 MMOL/L (ref 133–144)
TRIGL SERPL-MCNC: 104 MG/DL

## 2021-11-18 PROCEDURE — 80061 LIPID PANEL: CPT | Performed by: STUDENT IN AN ORGANIZED HEALTH CARE EDUCATION/TRAINING PROGRAM

## 2021-11-18 PROCEDURE — 87389 HIV-1 AG W/HIV-1&-2 AB AG IA: CPT | Performed by: STUDENT IN AN ORGANIZED HEALTH CARE EDUCATION/TRAINING PROGRAM

## 2021-11-18 PROCEDURE — 83036 HEMOGLOBIN GLYCOSYLATED A1C: CPT | Performed by: STUDENT IN AN ORGANIZED HEALTH CARE EDUCATION/TRAINING PROGRAM

## 2021-11-18 PROCEDURE — 86803 HEPATITIS C AB TEST: CPT | Performed by: STUDENT IN AN ORGANIZED HEALTH CARE EDUCATION/TRAINING PROGRAM

## 2021-11-18 PROCEDURE — 99213 OFFICE O/P EST LOW 20 MIN: CPT | Mod: 25 | Performed by: STUDENT IN AN ORGANIZED HEALTH CARE EDUCATION/TRAINING PROGRAM

## 2021-11-18 PROCEDURE — 80053 COMPREHEN METABOLIC PANEL: CPT | Performed by: STUDENT IN AN ORGANIZED HEALTH CARE EDUCATION/TRAINING PROGRAM

## 2021-11-18 PROCEDURE — G0103 PSA SCREENING: HCPCS | Performed by: STUDENT IN AN ORGANIZED HEALTH CARE EDUCATION/TRAINING PROGRAM

## 2021-11-18 PROCEDURE — 82306 VITAMIN D 25 HYDROXY: CPT | Performed by: STUDENT IN AN ORGANIZED HEALTH CARE EDUCATION/TRAINING PROGRAM

## 2021-11-18 PROCEDURE — 36415 COLL VENOUS BLD VENIPUNCTURE: CPT | Performed by: STUDENT IN AN ORGANIZED HEALTH CARE EDUCATION/TRAINING PROGRAM

## 2021-11-18 PROCEDURE — 99396 PREV VISIT EST AGE 40-64: CPT | Mod: GC | Performed by: STUDENT IN AN ORGANIZED HEALTH CARE EDUCATION/TRAINING PROGRAM

## 2021-11-18 RX ORDER — NEEDLES, FILTER 19GX1 1/2"
NEEDLE, DISPOSABLE MISCELLANEOUS
Qty: 2 EACH | Refills: 3 | Status: SHIPPED | OUTPATIENT
Start: 2021-11-18

## 2021-11-18 RX ORDER — SYRINGE WITH NEEDLE, 1 ML 25GX5/8"
SYRINGE, EMPTY DISPOSABLE MISCELLANEOUS
Qty: 2 EACH | Refills: 11 | Status: SHIPPED | OUTPATIENT
Start: 2021-11-18

## 2021-11-18 RX ORDER — NEEDLES, DISPOSABLE 25GX5/8"
NEEDLE, DISPOSABLE MISCELLANEOUS
Qty: 6 EACH | Refills: 11 | Status: SHIPPED | OUTPATIENT
Start: 2021-11-18

## 2021-11-18 RX ORDER — OMEPRAZOLE 40 MG/1
CAPSULE, DELAYED RELEASE ORAL
COMMUNITY
Start: 2021-08-28

## 2021-11-18 ASSESSMENT — ENCOUNTER SYMPTOMS
ABDOMINAL PAIN: 0
CHILLS: 0
WEAKNESS: 0
ARTHRALGIAS: 0
DYSURIA: 0
CONSTIPATION: 0
HEMATURIA: 0
JOINT SWELLING: 0
PARESTHESIAS: 0
FREQUENCY: 0
NAUSEA: 0
DIARRHEA: 0
NERVOUS/ANXIOUS: 0
HEMATOCHEZIA: 0
FEVER: 0
MYALGIAS: 0
COUGH: 0
HEARTBURN: 0
DIZZINESS: 0
SHORTNESS OF BREATH: 0
SORE THROAT: 0
PALPITATIONS: 0
EYE PAIN: 0
HEADACHES: 0

## 2021-11-18 ASSESSMENT — MIFFLIN-ST. JEOR: SCORE: 1778.56

## 2021-11-18 NOTE — PROGRESS NOTES
Patient contacted by phone to notify of HbA1c of 8.3    No answer but per chart VM ok to leave info    Provided update that unfortunately, as we discussed/were worried about a1c returned higher at 8.3 and now formally have diagnosis of DM. Would advise starting medications/DM education team as discussed. Dr. Mcfadden will be placing orders for this.

## 2021-11-18 NOTE — PATIENT INSTRUCTIONS
- take your testosterone every two weeks  - come back in 4 weeks for testosterone level check  - you may need to start diabetes meds prior to next visit  - avoid eating 2-3 hours prior to bed  - avoid acidic food  - consider trying Pepcid  - consider a sleep study in future   - follow up with optometry for eye exam      Preventive Health Recommendations  Male Ages 40 to 49    Yearly exam:             See your health care provider every year in order to  o   Review health changes.   o   Discuss preventive care.    o   Review your medicines if your doctor has prescribed any.    You should be tested each year for STDs (sexually transmitted diseases) if you re at risk.     Have a cholesterol test every 5 years.     Have a colonoscopy (test for colon cancer) if someone in your family has had colon cancer or polyps before age 50.     After age 45, have a diabetes test (fasting glucose). If you are at risk for diabetes, you should have this test every 3 years.      Talk with your health care provider about whether or not a prostate cancer screening test (PSA) is right for you.    Shots: Get a flu shot each year. Get a tetanus shot every 10 years.     Nutrition:    Eat at least 5 servings of fruits and vegetables daily.     Eat whole-grain bread, whole-wheat pasta and brown rice instead of white grains and rice.     Get adequate Calcium and Vitamin D.     Lifestyle    Exercise for at least 150 minutes a week (30 minutes a day, 5 days a week). This will help you control your weight and prevent disease.     Limit alcohol to one drink per day.     No smoking.     Wear sunscreen to prevent skin cancer.     See your dentist every six months for an exam and cleaning.

## 2021-11-18 NOTE — PROGRESS NOTES
SUBJECTIVE:   CC: Darrell Olivares is an 46 year old male who presents for preventative health visit.     Past medical history of hypogonadism on testosterone replacement therapy, as well as chronic cough seen by [facility] concern for potential eosinophilic esophagitis has been on prednisone therapy, seen by pulmonology.related to reflux is now currently only on omeprazole.  Seen in clinic for general physical as well as specific complaints developed 3 nights of sharp stabbing substernal paroxysmal chest pain lasting 1 to 2 seconds each night and spontaneously resolving pain does not radiate and resolved spontaneously on its own.  No shortness of breath.  Does notice a little bit of reflux sensation at the same time.  This happened in the context of lying flat he usually sleeps with 3 pillows or more propped up to help with the reflux.  Sporadic eating schedule has been having large meals near bedtime  Additionally he has been taking his testosterone instead of 1 injection every 2 weeks as a multi injection spread out over 5 days.  Since that time he has noticed he has had increased difficulties obtaining erections.  Per chart review patient has a hemoglobin A1c of 7.2 but has been given multiple steroid bursts, patient does not think he has diabetes.    Patient has been advised of split billing requirements and indicates understanding: Yes  Healthy Habits:     Getting at least 3 servings of Calcium per day:  Yes    Bi-annual eye exam:  NO    Dental care twice a year:  Yes    Sleep apnea or symptoms of sleep apnea:  None    Diet:  Carbohydrate counting    Frequency of exercise:  2-3 days/week    Duration of exercise:  45-60 minutes    Taking medications regularly:  Yes    Medication side effects:  None    PHQ-2 Total Score: 0    Additional concerns today:  No    }    Today's PHQ-2 Score:   PHQ-2 ( 1999 Pfizer) 11/18/2021   Q1: Little interest or pleasure in doing things 0   Q2: Feeling down, depressed or  hopeless 0   PHQ-2 Score 0   PHQ-2 Total Score (12-17 Years)- Positive if 3 or more points; Administer PHQ-A if positive -   Q1: Little interest or pleasure in doing things Not at all   Q2: Feeling down, depressed or hopeless Not at all   PHQ-2 Score 0       Abuse: Current or Past(Physical, Sexual or Emotional)- No  Do you feel safe in your environment? Yes    Have you ever done Advance Care Planning? (For example, a Health Directive, POLST, or a discussion with a medical provider or your loved ones about your wishes): Yes, patient states has an Advance Care Planning document and will bring a copy to the clinic.    Social History     Tobacco Use     Smoking status: Never Smoker     Smokeless tobacco: Never Used   Substance Use Topics     Alcohol use: Yes     If you drink alcohol do you typically have >3 drinks per day or >7 drinks per week? No    Alcohol Use 11/18/2021   Prescreen: >3 drinks/day or >7 drinks/week? No   Prescreen: >3 drinks/day or >7 drinks/week? -       Last PSA:   PSA   Date Value Ref Range Status   04/22/2021 0.89 0 - 4 ug/L Final     Comment:     Assay Method:  Chemiluminescence using Siemens Vista analyzer       Reviewed orders with patient. Reviewed health maintenance and updated orders accordingly - Yes  Lab work is in process    Reviewed and updated as needed this visit by clinical staff  Tobacco  Allergies  Meds   Med Hx  Surg Hx  Fam Hx  Soc Hx       Reviewed and updated as needed this visit by Provider               History reviewed. No pertinent past medical history.     Review of Systems   Constitutional: Negative for chills and fever.   HENT: Negative for congestion, ear pain, hearing loss and sore throat.    Eyes: Negative for pain and visual disturbance.   Respiratory: Negative for cough and shortness of breath.    Cardiovascular: Positive for chest pain. Negative for palpitations and peripheral edema.   Gastrointestinal: Negative for abdominal pain, constipation, diarrhea,  "heartburn, hematochezia and nausea.   Genitourinary: Positive for impotence. Negative for dysuria, frequency, genital sores, hematuria and urgency.   Musculoskeletal: Negative for arthralgias, joint swelling and myalgias.   Skin: Negative for rash.   Neurological: Negative for dizziness, weakness, headaches and paresthesias.   Psychiatric/Behavioral: Negative for mood changes. The patient is not nervous/anxious.      CONSTITUTIONAL: NEGATIVE for fever, chills, change in weight  EYES: Continuing to need reading glasses  ENT: NEGATIVE for ear, mouth and throat problems  RESP: NEGATIVE for significant cough or SOB  CV: See HPI, no palpitations or nor orthopnea nor bendopnea   GI: NEGATIVE for nausea, abdominal pain, heartburn, or change in bowel habits   male: Difficulty with erections  MUSCULOSKELETAL: NEGATIVE for significant arthralgias or myalgia  NEURO: NEGATIVE for weakness, dizziness or paresthesias  PSYCHIATRIC: NEGATIVE for changes in mood or affect    OBJECTIVE:   /66   Pulse 78   Temp 97.2  F (36.2  C)   Ht 1.67 m (5' 5.75\")   Wt 96 kg (211 lb 9.6 oz)   SpO2 97%   BMI 34.42 kg/m      Physical Exam  GENERAL: healthy, alert and no distress  EYES: Eyes grossly normal to inspection, PERRL and conjunctivae and sclerae normal  HENT: ear canals  normal, nose and mouth without ulcers or lesions  NECK: Large neck for body habitus  RESP: lungs clear to auscultation - no rales, rhonchi or wheezes  CV: regular rate and rhythm, normal S1 S2, no S3 or S4, no murmur, click or rub, no peripheral edema and peripheral pulses strong  ABDOMEN: soft, nontender, no rebound or guarding  MS: no gross musculoskeletal defects noted, no edema  SKIN: no suspicious lesions or rashes  NEURO: Normal strength and tone, mentation intact and speech normal  PSYCH: mentation appears normal, affect normal/bright    Diagnostic Test Results:  Labs reviewed in Epic    ASSESSMENT/PLAN:   Darrell was seen today for " "physical.    46-year-old male presenting with physical and concern for likely diabetes as well as hyperlipidemia improvement relative to diabetic goal.      Screening for diabetes mellitus  Patient has been on multiple courses of steroids. So a check hemoglobin A1c again today, anticipate that it will likely be high and discussed this with the patient and he may need to start on Metformin as well as diabetes education.  Also could consider SGLT2 inhibitor.    Likely will have to start a statin as well given his elevated LDL in the past relative to diabetic goal of less than 70.    Diagnoses and all orders for this visit:  -     OPTOMETRY REFERRAL; Future  -     HEMOGLOBIN A1C; Future  -     Comprehensive metabolic panel (BMP + Alb, Alk Phos, ALT, AST, Total. Bili, TP); Future  -     HEMOGLOBIN A1C  -     Comprehensive metabolic panel (BMP + Alb, Alk Phos, ALT, AST, Total. Bili, TP)    Screening for hyperlipidemia  -     Lipid panel reflex to direct LDL Fasting; Future  -     Comprehensive metabolic panel (BMP + Alb, Alk Phos, ALT, AST, Total. Bili, TP); Future  -     Lipid panel reflex to direct LDL Fasting  -     Comprehensive metabolic panel (BMP + Alb, Alk Phos, ALT, AST, Total. Bili, TP)    Routine general medical examination at a health care facility  -     Comprehensive metabolic panel (BMP + Alb, Alk Phos, ALT, AST, Total. Bili, TP); Future  -     Comprehensive metabolic panel (BMP + Alb, Alk Phos, ALT, AST, Total. Bili, TP)    Low testosterone  Return in 4 weeks for testosterone testing, at tim of testosterone, and continue to take testosterone as prescribed once every 2 weeks.  Concern for potential estrogenization of testosterone in the setting of low-dose spread over 5 days  -     Syringe/Needle, Disp, (BD LUER-SINA SYRINGE) 25G X 1-1/2\" 3 ML MISC; USE ONE SYRINGE TO INJECT TESTOSTERONE EVERY 14 DAYS  -     Needle, Disp, (BD DISP NEEDLES) 18G X 1-1/2\" MISC; USE ONE NEEDLE EVERY 14 DAYS TO DRAW UP " "TESTOSTERONE, SWITCH NEEDLE BEFORE INJECTION  -     syringe/needle, disp, (BD INTEGRA SYRINGE) 25G X 1\" 3 ML MISC; USE ONE SYRINGE TO INJECT TESTOSTERONE EVERY 14 DAYS    Screening for prostate cancer  Has had in past will grab to trend  -     PSA, screen; Future  -     PSA, screen    Screening for HIV (human immunodeficiency virus)  -     HIV Antigen Antibody Combo; Future  -     HIV Antigen Antibody Combo    Need for hepatitis C screening test  -     Hepatitis C Screen Reflex to HCV RNA Quant and Genotype; Future  -     Hepatitis C Screen Reflex to HCV RNA Quant and Genotype    Vitamin D deficiency  -     Vitamin D Deficiency; Future  -     Vitamin D Deficiency    Male hypogonadism  -     syringe/needle, disp, (BD INTEGRA SYRINGE) 25G X 1\" 3 ML MISC; USE ONE SYRINGE TO INJECT TESTOSTERONE EVERY 14 DAYS        Patient has been advised of split billing requirements and indicates understanding: Yes  COUNSELING:   Reviewed preventive health counseling, as reflected in patient instructions       Regular exercise       Healthy diet/nutrition       Vision screening       Alcohol Use        Consider Hep C screening for all patients one time for ages 18-79 years       HIV screeninx in teen years, 1x in adult years, and at intervals if high risk    Estimated body mass index is 34.42 kg/m  as calculated from the following:    Height as of this encounter: 1.67 m (5' 5.75\").    Weight as of this encounter: 96 kg (211 lb 9.6 oz).     Weight management plan: Discussed healthy diet and exercise guidelines    He reports that he has never smoked. He has never used smokeless tobacco.      Counseling Resources:  ATP IV Guidelines  Pooled Cohorts Equation Calculator  FRAX Risk Assessment  ICSI Preventive Guidelines  Dietary Guidelines for Americans,   USDA's MyPlate  ASA Prophylaxis  Lung CA Screening    Thaddeus Flanagan MD  [facility]    Patient seen and discussed with staff attending physician " Dr. Mcfadden

## 2021-11-19 DIAGNOSIS — E11.9 TYPE 2 DIABETES MELLITUS WITHOUT COMPLICATION, WITHOUT LONG-TERM CURRENT USE OF INSULIN (H): Primary | ICD-10-CM

## 2021-11-19 RX ORDER — GLUCOSAMINE HCL/CHONDROITIN SU 500-400 MG
CAPSULE ORAL
Qty: 100 EACH | Refills: 3 | Status: SHIPPED | OUTPATIENT
Start: 2021-11-19

## 2021-11-19 RX ORDER — LANCETS
EACH MISCELLANEOUS
Qty: 100 EACH | Refills: 6 | Status: SHIPPED | OUTPATIENT
Start: 2021-11-19

## 2021-11-22 ENCOUNTER — TELEPHONE (OUTPATIENT)
Dept: PEDIATRICS | Facility: CLINIC | Age: 46
End: 2021-11-22
Payer: COMMERCIAL

## 2021-11-22 NOTE — TELEPHONE ENCOUNTER
Diabetes Education Scheduling Outreach #1:    Call to patient to schedule. No answer/busy.    Plan for 2nd outreach attempt within 1 week.    Xiomy Huber OnCall  Diabetes and Nutrition Scheduling

## 2021-11-23 ENCOUNTER — TELEPHONE (OUTPATIENT)
Dept: PEDIATRICS | Facility: CLINIC | Age: 46
End: 2021-11-23
Payer: COMMERCIAL

## 2021-11-23 NOTE — TELEPHONE ENCOUNTER
Telephone call placed to patient, left a voice message with request for return call.  When the patient calls back:  -relay the below note from Dr. Mcfadden to the patient.   Inform the patient where to get the glucose monitor and supplies, and how to schedule with diabetes ed.     Visit with Dr. Ramos is scheduled for 21.    Diabetes education [phone].    [facility] pharmacy [place].    Alexander Sims RN on 2021 at 12:17 PM

## 2021-11-23 NOTE — TELEPHONE ENCOUNTER
Rachel Mcfadden MD   11/19/2021  9:33 AM CST         Please call -   New diagnosis of diabetes - pt has been prepped for this result during visit.  I will place orders for metformin start and glucose monitor as well as referral to diabetes educator.  I recommend he  supplies and bring to first appt with diabetic educator.  He should do this at his earliest convenience prior to his f/up appt with Dr. Ramos.       Will need to discuss possible cholesterol medication at f/up with Dr. Ramos.

## 2021-11-24 ENCOUNTER — TELEPHONE (OUTPATIENT)
Dept: PEDIATRICS | Facility: CLINIC | Age: 46
End: 2021-11-24
Payer: COMMERCIAL

## 2021-11-24 NOTE — TELEPHONE ENCOUNTER
Duplicate - see the other encounter for details.    Olvin, RN  Patient Advocate Liason (PAL)  [facility]

## 2021-11-24 NOTE — TELEPHONE ENCOUNTER
Pt called PAL direct line VM. Sounds like pt was calling back about the MTM/CDE . Would like a call back [phone]    Gavi Lugo EMT at 11:24 AM on November 24, 2021  [facility] Health Guide  [phone]

## 2021-11-24 NOTE — TELEPHONE ENCOUNTER
Called pt back, not available, so LM that Alexander will contact him on Friday to over the details.    Olvin, RN  Patient Advocate Liason (PAL)  [facility]

## 2021-11-29 NOTE — TELEPHONE ENCOUNTER
Diabetes Education Scheduling Outreach #2:    LOGIC DEVICEShart message sent.    Xiomy Huber OnCall  Diabetes and Nutrition Scheduling

## 2021-11-29 NOTE — TELEPHONE ENCOUNTER
Telephone call placed to patient, left a voice message with request for return call.    When the patient calls back:  -relay below note from Dr. Mcfadden to the patient.   -ensure the patient knows where to  diabetic supplies.   -the patient needs to schedule with Diabetes Ed.    Visit with Dr. Ramos is scheduled for 21.  Diabetes education [phone].  [facility] pharmacy [place].    Alexander Sims RN on 2021 at 5:07 PM

## 2021-12-03 ENCOUNTER — TELEPHONE (OUTPATIENT)
Dept: EDUCATION SERVICES | Facility: CLINIC | Age: 46
End: 2021-12-03
Payer: COMMERCIAL

## 2021-12-03 NOTE — TELEPHONE ENCOUNTER
"Enid,    I see pt was just scheduled into a \"On Hold- PCT\" spot on 12/7, but I am not filling those spots at this time. Can you please contact to help him reschedule into an open spot that is not on hold.    Thanks! Sorry for any confusion.    Lynette Pryor RN, [facility]      "

## 2021-12-03 NOTE — TELEPHONE ENCOUNTER
Called patient and spoke with patient.     He just started Metformin a few days ago, so far tolerating ok. He does report it makes him feel groggy/tired. He is taking with food in AM.    Recommended he could try taking his dose in the PM and ensuring he drinks plenty of water. Can follow up with DM Ed on Tuesday how he is tolerating his dose.     DM Supplies  - did     DM Ed:  - assisted with scheduling 12/7

## 2021-12-06 NOTE — TELEPHONE ENCOUNTER
Left another detailed message about rescheduled appointment. Left reminder message that this information can be reviewed on Present[employer]hart.

## 2021-12-17 ENCOUNTER — OFFICE VISIT (OUTPATIENT)
Dept: PEDIATRICS | Facility: CLINIC | Age: 46
End: 2021-12-17
Payer: COMMERCIAL

## 2021-12-17 VITALS
RESPIRATION RATE: 18 BRPM | TEMPERATURE: 97.5 F | WEIGHT: 202.6 LBS | HEIGHT: 66 IN | DIASTOLIC BLOOD PRESSURE: 68 MMHG | SYSTOLIC BLOOD PRESSURE: 98 MMHG | HEART RATE: 76 BPM | BODY MASS INDEX: 32.56 KG/M2 | OXYGEN SATURATION: 96 %

## 2021-12-17 DIAGNOSIS — E11.9 TYPE 2 DIABETES MELLITUS WITHOUT COMPLICATION, WITHOUT LONG-TERM CURRENT USE OF INSULIN (H): Primary | ICD-10-CM

## 2021-12-17 DIAGNOSIS — Z23 NEED FOR PROPHYLACTIC VACCINATION AND INOCULATION AGAINST INFLUENZA: ICD-10-CM

## 2021-12-17 PROCEDURE — 91300 COVID-19,PF,PFIZER (12+ YRS): CPT | Performed by: INTERNAL MEDICINE

## 2021-12-17 PROCEDURE — 0004A COVID-19,PF,PFIZER (12+ YRS): CPT | Performed by: INTERNAL MEDICINE

## 2021-12-17 PROCEDURE — 99214 OFFICE O/P EST MOD 30 MIN: CPT | Mod: 25 | Performed by: INTERNAL MEDICINE

## 2021-12-17 PROCEDURE — 90686 IIV4 VACC NO PRSV 0.5 ML IM: CPT | Performed by: INTERNAL MEDICINE

## 2021-12-17 PROCEDURE — 90471 IMMUNIZATION ADMIN: CPT | Performed by: INTERNAL MEDICINE

## 2021-12-17 RX ORDER — ATORVASTATIN CALCIUM 20 MG/1
20 TABLET, FILM COATED ORAL DAILY
Qty: 90 TABLET | Refills: 3 | Status: SHIPPED | OUTPATIENT
Start: 2021-12-17

## 2021-12-17 ASSESSMENT — MIFFLIN-ST. JEOR: SCORE: 1733.8

## 2021-12-17 NOTE — PROGRESS NOTES
Assessment & Plan     Type 2 diabetes mellitus without complication, without long-term current use of insulin (H)    Poor control, but has now lost weight and changed his diet.  Follow up with diabetes educator (CDE).  Ramp up metformin .    Patient Instructions   I'd try to increase your metformin to twice daily, 500 mg.  Then 1000 mg in AM and 500 at dinner for 2 weeks, then 1000 mg twice daily thereafter.    Start on the atorvastatin for cholesterol.    Start on baby aspirin 81 mg daily.     Resume your testosterone.    Follow up with me in 2 months for lab work.    Set up an appointment with diabetes educator (CDE); call for an appointment.     With diabetes, you need an diabetic eye exam every year!  Please have your eye doctor forward your yearly eye exam results to me: Dr. Ramos, fax . (You could instead just start seeing our eye clinic here at [place] by calling [phone], Dr. Fela Lieberman.)    Flu shot today.    COVID booster today.           - AMB Adult Diabetes Educator Referral; Future  - atorvastatin (LIPITOR) 20 MG tablet; Take 1 tablet (20 mg) by mouth daily  - metFORMIN (GLUCOPHAGE) 1000 MG tablet; Take 1 tab by mouth daily. Increase in increments of 1 tab (500 mg) every 1-2 wks as tolerated to a final dose of 2 tabs twice daily.  - aspirin (ASA) 81 MG EC tablet; Take 1 tablet (81 mg) by mouth daily    Need for prophylactic vaccination and inoculation against influenza                 See Patient Instructions    Return in about 2 months (around 2/17/2022) for diabetes followup, with me, in person.    Luke Ramos MD  [facility] JACQUELINE Ramírez is a 46 year old who presents for the following health issues     History of Present Illness       Diabetes:   He presents for follow up of diabetes.  He is not checking blood glucose. He has no concerns regarding his diabetes at this time.  He is not experiencing numbness or burning in feet, excessive thirst,  "blurry vision, weight changes or redness, sores or blisters on feet. The patient has not had a diabetic eye exam in the last 12 months.         He eats 2-3 servings of fruits and vegetables daily.He consumes 0 sweetened beverage(s) daily.He exercises with enough effort to increase his heart rate 60 or more minutes per day.  He exercises with enough effort to increase his heart rate 6 days per week.   He is taking medications regularly.       Resumed his metformin now. But only on 500 at bedtime.       Not getting his glucometer to work.     Has not been doing testosterone either.           Review of Systems   CONSTITUTIONAL: NEGATIVE for fever, chills, change in weight  INTEGUMENTARY/SKIN: NEGATIVE for worrisome rashes, moles or lesions  EYES: NEGATIVE for vision changes or irritation  ENT/MOUTH: NEGATIVE for ear, mouth and throat problems  RESP: NEGATIVE for significant cough or SOB  BREAST: NEGATIVE for masses, tenderness or discharge  CV: NEGATIVE for chest pain, palpitations or peripheral edema  GI: NEGATIVE for nausea, abdominal pain, heartburn, or change in bowel habits  : NEGATIVE for frequency, dysuria, or hematuria  MUSCULOSKELETAL: NEGATIVE for significant arthralgias or myalgia  NEURO: NEGATIVE for weakness, dizziness or paresthesias  ENDOCRINE: NEGATIVE for temperature intolerance, skin/hair changes  HEME: NEGATIVE for bleeding problems  PSYCHIATRIC: NEGATIVE for changes in mood or affect      Objective    BP 98/68 (BP Location: Right arm, Patient Position: Chair, Cuff Size: Adult Large)   Pulse 76   Temp 97.5  F (36.4  C) (Tympanic)   Resp 18   Ht 1.664 m (5' 5.5\")   Wt 91.9 kg (202 lb 9.6 oz)   SpO2 96%   BMI 33.20 kg/m    Body mass index is 33.2 kg/m .  Physical Exam   GENERAL: healthy, alert and no distress  EYES: Eyes grossly normal to inspection, PERRL and conjunctivae and sclerae normal  HENT: ear canals and TM's normal, nose and mouth without ulcers or lesions  NECK: no adenopathy, no " asymmetry, masses, or scars and thyroid normal to palpation  RESP: lungs clear to auscultation - no rales, rhonchi or wheezes  CV: regular rate and rhythm, normal S1 S2, no S3 or S4, no murmur, click or rub, no peripheral edema and peripheral pulses strong  ABDOMEN: soft, nontender, no hepatosplenomegaly, no masses and bowel sounds normal  MS: no gross musculoskeletal defects noted, no edema  SKIN: no suspicious lesions or rashes  NEURO: Normal strength and tone, mentation intact and speech normal  PSYCH: mentation appears normal, affect normal/bright

## 2021-12-17 NOTE — PATIENT INSTRUCTIONS
I'd try to increase your metformin to twice daily, 500 mg.  Then 1000 mg in AM and 500 at dinner for 2 weeks, then 1000 mg twice daily thereafter.    Start on the atorvastatin for cholesterol.    Start on baby aspirin 81 mg daily.     Resume your testosterone.    Follow up with me in 2 months for lab work.    Set up an appointment with diabetes educator (CDE); call for an appointment.     With diabetes, you need an diabetic eye exam every year!  Please have your eye doctor forward your yearly eye exam results to me: Dr. Ramos, fax . (You could instead just start seeing our eye clinic here at [place] by calling [phone], Dr. Fela Lieberman.)    Flu shot today.    COVID booster today.

## 2021-12-22 ENCOUNTER — TELEPHONE (OUTPATIENT)
Dept: PEDIATRICS | Facility: CLINIC | Age: 46
End: 2021-12-22
Payer: COMMERCIAL

## 2021-12-22 NOTE — TELEPHONE ENCOUNTER
Diabetes Education Scheduling Outreach #1:    Call to patient to schedule. Left message with phone number to call to schedule.    Plan for 2nd outreach attempt within 1 week.    Xiomy Gomez  [place]  Diabetes and Nutrition Scheduling

## 2022-01-02 NOTE — TELEPHONE ENCOUNTER
Diabetes Education Scheduling Outreach #2:    Heekyahart message to patient requesting to call to schedule.    Xiomy Huber OnCall  Diabetes and Nutrition Scheduling

## 2022-01-31 ENCOUNTER — PATIENT OUTREACH (OUTPATIENT)
Dept: PEDIATRICS | Facility: CLINIC | Age: 47
End: 2022-01-31
Payer: COMMERCIAL

## 2022-01-31 NOTE — TELEPHONE ENCOUNTER
Patient has been contacted by CDE in the past 2 months.     Alexander Sims RN on 1/31/2022 at 10:38 AM

## 2022-05-07 DIAGNOSIS — E29.1 MALE HYPOGONADISM: ICD-10-CM

## 2022-05-07 DIAGNOSIS — R79.89 LOW TESTOSTERONE: ICD-10-CM

## 2022-05-09 RX ORDER — TESTOSTERONE CYPIONATE 200 MG/ML
INJECTION, SOLUTION INTRAMUSCULAR
Qty: 2 ML | Refills: 2 | Status: SHIPPED | OUTPATIENT
Start: 2022-05-09

## 2022-05-09 NOTE — TELEPHONE ENCOUNTER
Routing refill request to provider for review/approval because:  Androgen Agents Failed 05/07/2022 09:55 AM   Protocol Details  HCT less than 54% on file within past 12 mos    Serum Testosterone on file within past 12 mos    Refills for this classification require provider review     Talya Mian RN

## 2022-05-21 ENCOUNTER — HEALTH MAINTENANCE LETTER (OUTPATIENT)
Age: 47
End: 2022-05-21

## 2022-07-20 ENCOUNTER — OFFICE VISIT (OUTPATIENT)
Dept: PEDIATRICS | Facility: CLINIC | Age: 47
End: 2022-07-20

## 2022-07-20 VITALS
TEMPERATURE: 98 F | RESPIRATION RATE: 16 BRPM | OXYGEN SATURATION: 98 % | BODY MASS INDEX: 31.88 KG/M2 | DIASTOLIC BLOOD PRESSURE: 68 MMHG | WEIGHT: 198.4 LBS | SYSTOLIC BLOOD PRESSURE: 90 MMHG | HEIGHT: 66 IN | HEART RATE: 84 BPM

## 2022-07-20 DIAGNOSIS — R30.0 DYSURIA: Primary | ICD-10-CM

## 2022-07-20 DIAGNOSIS — N50.82 SCROTAL PAIN: ICD-10-CM

## 2022-07-20 DIAGNOSIS — E11.9 TYPE 2 DIABETES MELLITUS WITHOUT COMPLICATION, WITHOUT LONG-TERM CURRENT USE OF INSULIN (H): ICD-10-CM

## 2022-07-20 LAB
ALBUMIN UR-MCNC: NEGATIVE MG/DL
APPEARANCE UR: CLEAR
BILIRUB UR QL STRIP: NEGATIVE
COLOR UR AUTO: YELLOW
GLUCOSE UR STRIP-MCNC: NEGATIVE MG/DL
HBA1C MFR BLD: 7.3 % (ref 0–5.6)
HGB UR QL STRIP: NEGATIVE
KETONES UR STRIP-MCNC: NEGATIVE MG/DL
LEUKOCYTE ESTERASE UR QL STRIP: NEGATIVE
NITRATE UR QL: NEGATIVE
PH UR STRIP: 7 [PH] (ref 5–7)
SP GR UR STRIP: 1.02 (ref 1–1.03)
UROBILINOGEN UR STRIP-ACNC: 0.2 E.U./DL

## 2022-07-20 PROCEDURE — 81003 URINALYSIS AUTO W/O SCOPE: CPT | Performed by: NURSE PRACTITIONER

## 2022-07-20 PROCEDURE — 99214 OFFICE O/P EST MOD 30 MIN: CPT | Performed by: NURSE PRACTITIONER

## 2022-07-20 PROCEDURE — 83036 HEMOGLOBIN GLYCOSYLATED A1C: CPT | Performed by: NURSE PRACTITIONER

## 2022-07-20 PROCEDURE — 36415 COLL VENOUS BLD VENIPUNCTURE: CPT | Performed by: NURSE PRACTITIONER

## 2022-07-20 RX ORDER — SULFAMETHOXAZOLE/TRIMETHOPRIM 800-160 MG
1 TABLET ORAL 2 TIMES DAILY
Qty: 20 TABLET | Refills: 0 | Status: SHIPPED | OUTPATIENT
Start: 2022-07-20 | End: 2022-07-30

## 2022-07-20 ASSESSMENT — PAIN SCALES - GENERAL: PAINLEVEL: MODERATE PAIN (4)

## 2022-07-20 ASSESSMENT — ASTHMA QUESTIONNAIRES: ACT_TOTALSCORE: 24

## 2022-07-20 NOTE — PATIENT INSTRUCTIONS
Urine does not look infected at all    I would recommend pushing fluids, regular use of ibuprofen, you can elevate the scrotum as well    Start antibiotic    If not improving in 2-3 days to let me know, but sooner If you have worsening pain, fever, chills, unable to urinate then please be seen sooner.

## 2022-07-20 NOTE — PROGRESS NOTES
Assessment & Plan   Dysuria  Scrotal pain  Possible epidymiditis vs prostatitis? He is very low risk for STIs. Per chart review-has had similar problem in the 2017 treated with doxy. If not improving in the next 2 days would proceed with scrotal US and low threshold to refer to urology due to hx of this. Continue with NSAIDs and do scrotal ice/elevation.  - UA Macro with Reflex to Micro and Culture - lab collect; Future  - UA Macro with Reflex to Micro and Culture - lab collect  - sulfamethoxazole-trimethoprim (BACTRIM DS) 800-160 MG tablet; Take 1 tablet by mouth 2 times daily for 10 days    Type 2 diabetes mellitus without complication, without long-term current use of insulin (H)  Due for labs, checking sugars and stable.  - Hemoglobin A1c; Future  - Hemoglobin A1c      Patient Instructions   Urine does not look infected at all    I would recommend pushing fluids, regular use of ibuprofen, you can elevate the scrotum as well    Start antibiotic    If not improving in 2-3 days to let me know, but sooner If you have worsening pain, fever, chills, unable to urinate then please be seen sooner.      Return in about 2 years (around 7/20/2024), or if symptoms worsen or fail to improve.    Rianna Torre NP  [facility] JACQUELINE Ramírez is a 47 year old, presenting for the following health issues:  Prostate Problem (Gassy and bloating.)      HPI   Genitourinary - Male  Onset/Duration: one wk  Description:   Dysuria (painful urination): YES  Hematuria (blood in urine): No  Frequency: YES  Waking at night to urinate: No  Hesitancy (delay in urine): YES  Retention (unable to empty): No  Decrease in urinary flow: YES  Incontinence: No  Progression of Symptoms:  worsening  Accompanying Signs & Symptoms:  Fever: No  Back/Flank pain: No  Urethral discharge: No  Testicle lumps/masses/pain: YES-pain  Nausea and/or vomiting: No  Abdominal pain: No  History:   History of frequent UTI s: No. Hx of  "prostatitis  History of kidney stones: No  History of hernias: No  Personal or Family history of Prostate problems: self  Sexually active: No  Precipitating or alleviating factors: None  Therapies tried and outcome: increase water intake    Lab Results   Component Value Date    A1C 8.3 11/18/2021    A1C 7.3 04/22/2021    A1C 6.2 09/25/2020    A1C 6.8 01/23/2020    A1C 6.0 05/08/2019    A1C 6.2 09/06/2018     Had similar issue in the past that was given doxy  No concern for STIs- no sexual partners in the last 5 years  Pain diffusely to scrotum \"from my penis to my rectum\"  Pain improves with advil but then returns  Denies fever, chills, penile discharge, penile rash, abdominal pain, N/V, hematuria      Taking 1 tab of metformin in the morning and evening  Feels like a \"zombie\" if he takes 2 tabs  Blood sugars stable, never over 200    Review of Systems   Constitutional, HEENT, cardiovascular, pulmonary, gi and gu systems are negative, except as otherwise noted.      Objective    BP 90/68   Pulse 84   Temp 98  F (36.7  C) (Oral)   Resp 16   Ht 1.67 m (5' 5.75\")   Wt 90 kg (198 lb 6.4 oz)   SpO2 98%   BMI 32.27 kg/m    Body mass index is 32.27 kg/m .  Physical Exam   GENERAL: healthy, alert and no distress  ABDOMEN: soft, nontender, no hepatosplenomegaly, no masses and bowel sounds normal   (male): normal male genitalia without lesions or urethral discharge, no hernia. A few papules located to penis and scrotum (he tells me these have been there for years)                    .  ..  "

## 2022-09-11 ENCOUNTER — HEALTH MAINTENANCE LETTER (OUTPATIENT)
Age: 47
End: 2022-09-11

## 2022-12-08 NOTE — PROGRESS NOTES
Pre-Visit Planning     Future Appointments   Date Time Provider Department Center   9/2/2020  1:00 PM Molly Yoon MD [facility]     Arrival Time for this Appointment:  1:00 PM   Appointment Notes for this encounter:      *SCAN ID*  physical    Questionnaires Reviewed/Assigned  No additional questionnaires are needed       Patient preferred phone number: [phone]    Unable to reach. Left voicemail. Advised patient to call clinic back at [phone].     No

## 2022-12-23 ENCOUNTER — PATIENT OUTREACH (OUTPATIENT)
Dept: PEDIATRICS | Facility: CLINIC | Age: 47
End: 2022-12-23

## 2022-12-23 NOTE — TELEPHONE ENCOUNTER
SB3 Welcome Letter sent.     Macie OCAMPO RN   Patient Advocate Liaison (PAL)  [facility]  [phone]

## 2022-12-23 NOTE — LETTER
Darrell Fontanancourt  [address] [place] [address]    Mariia Ramírez,    Thank you for choosing [facility] today for your health care needs.     [facility] is transforming primary care  At [facility], we re dedicated to constantly improve how we serve the health care needs of our patients and communities. We re currently making changes to the way we deliver care.     Changes you ll notice include:    An emphasis on building a relationship with a primary care provider    Access to a PAL (patient advocate and liaison) to help guide you with your care needs    Appointment lengths tailored to your specific needs and greater access to a care team to help you and your provider improve and maintain your health and well-being    Improved online access to your care team    Benefits of a primary care provider  If you don t have a designated primary care provider, we encourage you to get to know our care team online and find a provider you d like to see. Most of our providers have a short video on their online provider page. Visit [email or web address] to explore our providers and locations.    Benefits of having a primary care provider include:      They get to know you - your health history, family history and goals, making it easier to make a health plan together.     You get to know them - making health-related conversations and decisions easier      Primary care doctors help you when you re sick or hurt - but also focus on keeping you healthy with preventive care and screenings.      A doctor who sees you regularly is more likely to notice changes in your health.     You ll be connected to a broad care team who partners with your provider to support you.    Patient Advocate Liaison (PAL)   To help make sure you get the right care, at the right time, we include PALs, or Patient Advocate Liaisons, as part of your care team. Your PAL will be your first line of contact. They ll advocate for your needs and  help you navigate our services, connecting you with care team members and community resources to ensure your care is well coordinated. You ll be introduced to a PAL in an upcoming visit.     Expanded care team access with tailored appointment lengths  Depending on your health care needs, you may have longer or shorter appointments and see additional care team providers - including Medication Therapy Management (MTM) pharmacists, diabetes educators, behavioral health clinicians, or social workers. At times, they may be included in your visit with your provider, or you may see them individually.     Online access to your health care records and care team  [employer] is our online tool that makes it easy to see your health care information and communicate with your care team.     [employer] allows you to:     View your health maintenance plan so you know when you re due for a preventive screening    Send secure messages to your care team    View your health history and visit summaries     Schedule appointments     Complete questionnaires and eCheck-in before appointments      Get care from your provider with an e-visit      View and pay your bill     Sign up at [employer]. Once you have an account, you also can download the mobile millie.     Connecting to fast and convenient care  When you need fast, convenient care - consider one of the following options:       Video Visit: A convenient care option for visiting with your provider out of the comfort of your own home. Most of the things you come to the clinic to address with your provider can now be done virtually through a video. This includes your chronic medication follow up, questions or concerns you may have, and even your annual Medicare Wellness Visit.       Phone Visit: Another convenient option for follow up of common problems that may require a more in-depth discussion with your provider.       E-visit: When you need acute care quickly, or have a quick  question about your medication, an E-visit is completed through [employer] and your provider will respond within one business day.      Macie OCAMPO RN   Patient Advocate Liaison (PAL)  [facility]   [phone]

## 2023-01-23 ENCOUNTER — HEALTH MAINTENANCE LETTER (OUTPATIENT)
Age: 48
End: 2023-01-23

## 2023-07-29 ENCOUNTER — HEALTH MAINTENANCE LETTER (OUTPATIENT)
Age: 48
End: 2023-07-29

## 2023-10-23 ENCOUNTER — MYC MEDICAL ADVICE (OUTPATIENT)
Dept: PEDIATRICS | Facility: CLINIC | Age: 48
End: 2023-10-23

## 2023-10-23 NOTE — TELEPHONE ENCOUNTER
Patient Quality Outreach Health Maintenance - PAL RN    Summary:    PAL RN contacted pt regarding overdue health maintenance    Patient is due/failing the following:   Diabetes -  A1C, Eye Exam, Microalbumin, and Foot Exam  Colon Cancer Screening  Depression  -  PHQ-9 needed  Physical Preventive Adult Physical      Topic Date Due    Hepatitis B Vaccine (1 of 3 - 3-dose series) Never done    Pneumococcal Vaccine (2 - PCV) 07/06/2018    Diptheria Tetanus Pertussis (DTAP/TDAP/TD) Vaccine (2 - Td or Tdap) 03/01/2023    Flu Vaccine (1) 09/01/2023    COVID-19 Vaccine (4 - 2023-24 season) 09/01/2023       Health Maintenance Due   Topic Date Due    HEPATITIS B IMMUNIZATION (1 of 3 - 3-dose series) Never done    COLORECTAL CANCER SCREENING  Never done    Pneumococcal Vaccine: Pediatrics (0 to 5 Years) and At-Risk Patients (6 to 64 Years) (2 - PCV) 07/06/2018    EYE EXAM  05/01/2020    DIABETIC FOOT EXAM  09/02/2021    MICROALBUMIN  09/25/2021    ANNUAL REVIEW OF HM ORDERS  04/22/2022    YEARLY PREVENTIVE VISIT  11/18/2022    BMP  11/18/2022    LIPID  11/18/2022    PHQ-2 (once per calendar year)  01/01/2023    A1C  01/20/2023    DTAP/TDAP/TD IMMUNIZATION (2 - Td or Tdap) 03/01/2023    INFLUENZA VACCINE (1) 09/01/2023    COVID-19 Vaccine (4 - 2023-24 season) 09/01/2023       Type of outreach:    Sent [employer] message. Attached the PHQ-9 Questionnaire to the [employer] message for the patient to complete.     - Advised patient if any questions or concerns comes up to call the PAL RN.   - Patient given opportunity to ask questions and at this time there is nothing further needed.     Questions for provider review:    None    Macie OCAMPO RN   Patient Advocate Liaison (PAL)  [facility]   [phone]    (2) assistive person

## 2024-02-24 ENCOUNTER — HEALTH MAINTENANCE LETTER (OUTPATIENT)
Age: 49
End: 2024-02-24

## 2024-09-21 ENCOUNTER — HEALTH MAINTENANCE LETTER (OUTPATIENT)
Age: 49
End: 2024-09-21

## 2025-03-09 ENCOUNTER — HEALTH MAINTENANCE LETTER (OUTPATIENT)
Age: 50
End: 2025-03-09

## 2025-04-05 ENCOUNTER — HEALTH MAINTENANCE LETTER (OUTPATIENT)
Age: 50
End: 2025-04-05